# Patient Record
Sex: FEMALE | Race: WHITE | HISPANIC OR LATINO | Employment: UNEMPLOYED | URBAN - METROPOLITAN AREA
[De-identification: names, ages, dates, MRNs, and addresses within clinical notes are randomized per-mention and may not be internally consistent; named-entity substitution may affect disease eponyms.]

---

## 2021-01-13 ENCOUNTER — OFFICE VISIT (OUTPATIENT)
Dept: OBGYN CLINIC | Facility: CLINIC | Age: 31
End: 2021-01-13

## 2021-01-13 VITALS
WEIGHT: 230 LBS | SYSTOLIC BLOOD PRESSURE: 132 MMHG | HEIGHT: 69 IN | DIASTOLIC BLOOD PRESSURE: 64 MMHG | BODY MASS INDEX: 34.07 KG/M2 | HEART RATE: 81 BPM

## 2021-01-13 DIAGNOSIS — N91.2 AMENORRHEA: Primary | ICD-10-CM

## 2021-01-13 PROBLEM — Z3A.01 LESS THAN 8 WEEKS GESTATION OF PREGNANCY: Status: ACTIVE | Noted: 2021-01-13

## 2021-01-13 LAB — SL AMB POCT URINE HCG: POSITIVE

## 2021-01-13 PROCEDURE — 81025 URINE PREGNANCY TEST: CPT | Performed by: OBSTETRICS & GYNECOLOGY

## 2021-01-13 PROCEDURE — 99203 OFFICE O/P NEW LOW 30 MIN: CPT | Performed by: OBSTETRICS & GYNECOLOGY

## 2021-01-13 NOTE — PROGRESS NOTES
Note on use of High Level Disinfection Process (Trophon) for transvaginal probe:   Render Hopping   REF: [id number]   SN: [id number]     [address] [place], [place] [id number]

## 2021-01-13 NOTE — PROGRESS NOTES
Abrahan Lewis is a 31 y o  [id number] who presents for viability scan  Her LMP was 20, EGA 8w2d today     This was a planned and welcomed pregnancy with a new FOB   She previously had 2 prior children, both girls, age 5 and 11yo at a different hospital     Chief complaint today: none    OBHx: she reports 2 , she does report during her last pregnancy her blood sugar was "very elevated" she does not recall being on insulin or oral medications    ROS:   VB: denies   LOF: denies   CXN: denies   FM: n/a    Vitals:    21 1349   BP: 132/64   Pulse: 81       TVUS  Ochoa IUP measuring 91mm with EGA 7w0d and JOLENE 21  Yolk sac present  Gestational sac present  FM witnessed  FHT 145bpm    Medications  Continue PNV  Reviewed nausea and vomiting precautions in early pregnancy    RTC  2-3 week for PN intake with nurse and H&P with doctor  Reviewed early 1 hour glucola in addition to standard prenatal labs  Reviewed standard early pregnancy changes, recommendations, nutrition, folic acid    All questions answered to patient's Morenita Davidw to call with any questions or concerns      COVID 19 precautions were discussed with patient at length, reviewed symptoms, hygiene, social distancing, patient to call office  with questions/concerns      Future Appointments   Date Time Provider Jesse Warren   2021  9:30 AM [facility] [address],   PGY-4 OB/GYN   2021 2:50 PM

## 2021-01-26 ENCOUNTER — TELEPHONE (OUTPATIENT)
Dept: OBGYN CLINIC | Facility: CLINIC | Age: 31
End: 2021-01-26

## 2021-01-27 ENCOUNTER — INITIAL PRENATAL (OUTPATIENT)
Dept: OBGYN CLINIC | Facility: CLINIC | Age: 31
End: 2021-01-27

## 2021-01-27 ENCOUNTER — LAB (OUTPATIENT)
Dept: LAB | Facility: HOSPITAL | Age: 31
End: 2021-01-27

## 2021-01-27 VITALS
HEART RATE: 78 BPM | WEIGHT: 231 LBS | BODY MASS INDEX: 34.21 KG/M2 | SYSTOLIC BLOOD PRESSURE: 115 MMHG | DIASTOLIC BLOOD PRESSURE: 77 MMHG | HEIGHT: 69 IN

## 2021-01-27 DIAGNOSIS — Z3A.09 9 WEEKS GESTATION OF PREGNANCY: Primary | ICD-10-CM

## 2021-01-27 DIAGNOSIS — Z34.91 PREGNANT AND NOT YET DELIVERED IN FIRST TRIMESTER: ICD-10-CM

## 2021-01-27 DIAGNOSIS — Z3A.09 9 WEEKS GESTATION OF PREGNANCY: ICD-10-CM

## 2021-01-27 DIAGNOSIS — Z86.2 H/O IRON DEFICIENCY ANEMIA: ICD-10-CM

## 2021-01-27 LAB
ABO GROUP BLD: NORMAL
BACTERIA UR QL AUTO: NORMAL /HPF
BASOPHILS # BLD AUTO: 0.04 THOUSANDS/ΜL (ref 0–0.1)
BASOPHILS NFR BLD AUTO: 0 % (ref 0–1)
BILIRUB UR QL STRIP: NEGATIVE
BLD GP AB SCN SERPL QL: NEGATIVE
CLARITY UR: CLEAR
COLOR UR: YELLOW
EOSINOPHIL # BLD AUTO: 0.07 THOUSAND/ΜL (ref 0–0.61)
EOSINOPHIL NFR BLD AUTO: 1 % (ref 0–6)
ERYTHROCYTE [DISTWIDTH] IN BLOOD BY AUTOMATED COUNT: 12.5 % (ref 11.6–15.1)
GLUCOSE UR STRIP-MCNC: NEGATIVE MG/DL
HBV SURFACE AG SER QL: NORMAL
HCT VFR BLD AUTO: 34.4 % (ref 34.8–46.1)
HGB BLD-MCNC: 11.3 G/DL (ref 11.5–15.4)
HGB UR QL STRIP.AUTO: ABNORMAL
HYALINE CASTS #/AREA URNS LPF: NORMAL /LPF
IMM GRANULOCYTES # BLD AUTO: 0.14 THOUSAND/UL (ref 0–0.2)
IMM GRANULOCYTES NFR BLD AUTO: 1 % (ref 0–2)
KETONES UR STRIP-MCNC: NEGATIVE MG/DL
LEUKOCYTE ESTERASE UR QL STRIP: NEGATIVE
LYMPHOCYTES # BLD AUTO: 2.33 THOUSANDS/ΜL (ref 0.6–4.47)
LYMPHOCYTES NFR BLD AUTO: 23 % (ref 14–44)
MCH RBC QN AUTO: 28 PG (ref 26.8–34.3)
MCHC RBC AUTO-ENTMCNC: 32.8 G/DL (ref 31.4–37.4)
MCV RBC AUTO: 85 FL (ref 82–98)
MONOCYTES # BLD AUTO: 0.64 THOUSAND/ΜL (ref 0.17–1.22)
MONOCYTES NFR BLD AUTO: 6 % (ref 4–12)
NEUTROPHILS # BLD AUTO: 7.03 THOUSANDS/ΜL (ref 1.85–7.62)
NEUTS SEG NFR BLD AUTO: 69 % (ref 43–75)
NITRITE UR QL STRIP: NEGATIVE
NON-SQ EPI CELLS URNS QL MICRO: NORMAL /HPF
NRBC BLD AUTO-RTO: 0 /100 WBCS
PH UR STRIP.AUTO: 5.5 [PH]
PLATELET # BLD AUTO: 193 THOUSANDS/UL (ref 149–390)
PMV BLD AUTO: 10.4 FL (ref 8.9–12.7)
PROT UR STRIP-MCNC: NEGATIVE MG/DL
RBC # BLD AUTO: 4.04 MILLION/UL (ref 3.81–5.12)
RBC #/AREA URNS AUTO: NORMAL /HPF
RH BLD: POSITIVE
RPR SER QL: NORMAL
RUBV IGG SERPL IA-ACNC: 66.5 IU/ML
SP GR UR STRIP.AUTO: 1.02 (ref 1–1.03)
SPECIMEN EXPIRATION DATE: NORMAL
UROBILINOGEN UR QL STRIP.AUTO: 0.2 E.U./DL
WBC # BLD AUTO: 10.25 THOUSAND/UL (ref 4.31–10.16)
WBC #/AREA URNS AUTO: NORMAL /HPF

## 2021-01-27 PROCEDURE — 99211 OFF/OP EST MAY X REQ PHY/QHP: CPT

## 2021-01-27 PROCEDURE — 87086 URINE CULTURE/COLONY COUNT: CPT

## 2021-01-27 PROCEDURE — 81001 URINALYSIS AUTO W/SCOPE: CPT

## 2021-01-27 PROCEDURE — 80081 OBSTETRIC PANEL INC HIV TSTG: CPT

## 2021-01-27 PROCEDURE — 36415 COLL VENOUS BLD VENIPUNCTURE: CPT

## 2021-01-27 PROCEDURE — 87147 CULTURE TYPE IMMUNOLOGIC: CPT

## 2021-01-27 NOTE — LETTER
Dentist Letter    [facility]  1990  [address] [place] [address]          01/27/21    We have had several requests from local dentist requesting permission to perform procedures on our patients who are pregnant  We wish to respond with this letter regarding some of the more routine procedures that we have been asked about  The following procedures may be performed on our obstetric patients:   1  Administration of local anesthesia   2  Administration of antibiotics such as PCN, Ampicillin, and Erythromycin  3  Administration of pain medications such as Tylenol, Tylenol with codeine, and if needed Percocet  4  Shielded X-rays    Should you have any questions, please do not hesitate to contact at [phone]          Sincerely,    [facility]  [phone]

## 2021-01-27 NOTE — LETTER
Proof of Pregnancy Letter    Andreia Pineda  1990  [address] [place] [id number]        01/27/21      Andreia Sung is a patient at our facility  Andreia Sung Estimated Date of Delivery: 09/01/2021       Any questions or concerns, please feel free to contact our office      Sincerely,     [facility]   [address]   [facility], [address] [place] [address]   [phone]

## 2021-01-27 NOTE — PATIENT INSTRUCTIONS
Coronavirus (NUUWQ-55) pregnancy FAQs: Medical experts answer your questions  From [employer] cy  [email or web address]  bc (courtesy of [facility])  As of 3/18/20  By Darrick Everett 38  Medically reviewed by Society for Maternal-Fetal Medicine       We asked parents-to-be to send us their most pressing questions about coronavirus (COVID-19)  Among them: Is it still safe to deliver in a hospital? What if my ob-gyn has the virus? We sent those questions to the top docs at the Society for Maternal-Fetal Medicine  Here are their answers  The coronavirus (COVID-19) pandemic has been declared a national emergency in the [facility] by [employer]  Moms-to-be like you are concerned about everything from getting medicines to managing disruptions at work  But above and beyond any worry about lifestyle changes is a focus on your health and the impact of COVID-19 on your pregnancy  We asked obstetrics doctors who handle the most complicated pregnancy issues to answer your questions about the coronavirus  Here are their responses, provided by Dr Afsaneh Munguia and her colleagues at the Society for [place] [employer]  Am I at more risk for COVID-19 if I'm pregnant? We don't know  Pregnancy does change your immune system in ways that might make you more susceptible to viral respiratory infections like COVID-19  If you become infected, you might also be at higher risk for more severe illness compared to the general population  Although this does not appear to be the case with COVID-19, based on limited data so far, a higher risk has been true for pregnant women with other coronavirus infections (SARS-CoV and MERS-CoV) and other viral respiratory infections, such as flu  It's important to take preventive actions to avoid infection, such as washing your hands often and avoiding people who are sick      How might coronavirus affect my pregnancy? Again, we don't know  Women with other coronavirus infections (such as SARS-CoV) did not have miscarriage or stillbirth at higher rates than the general population  We know that having other respiratory viral infections during pregnancy, such as flu, has been associated with problems like low birth weight and  birth  Also, having a high fever early in pregnancy may increase the risk of certain birth defects  Could I transmit coronavirus to my baby during pregnancy or delivery? We don't know whether you could transmit COVID-19 to your baby before or during delivery  However, among the few case studies of infants born to mothers with COVID-23 published in peer-reviewed literature, none of the infants tested positive for the virus  Also, there was no virus detected in samples of amniotic fluid or breast milk  There have been a few reports of newborns as young as a few days old with infection, suggesting that a mother can transmit the infection to her infant through close contact after delivery  There have been no reports of mother-to-baby transmission for other coronaviruses (MERS-CoV and SARS-CoV), although only limited information is available  Is it safe for me to deliver at a hospital where there have been COVID-19 cases? Yes  We know that COVID-19 is a very scary virus  The good news is that hospitals are taking great precautions to keep patients and healthcare providers safe  When a patient is even suspected to have COVID-19, they are placed in a negative pressure room  (Think of these rooms as vacuums that suck and filter the air so it's safe for the other people in the hospital)  This makes it possible for you to deliver at the hospital without putting you or your baby at risk  Hospitals are also implementing stricter visiting policies to keep patients safe  It's worth calling your hospital to check if there are any new regulations to be aware of      What plans should I make now in case the hospital system is overwhelmed when it's time for me to deliver? This is a great question to talk with your doctor or midwife about when you're 34 to 36 weeks pregnant  Every hospital is making different plans for dealing with this scenario  I work in healthcare  Should I ask my doctor to excuse me from work until the baby is born? What if I work in a school, the travel [employer], or some other high-risk setting? Healthcare facilities should take care to limit the exposure of pregnant employees to patients with confirmed or suspected COVID-19, just as they would with other infectious cases  If you continue working, be sure to follow the CDC's risk assessment and infection control guidelines  If you work in a school, travel [employer], or other high-risk setting, talk with your employer about what it's doing to protect employees and minimize infection risks  Wash your hands often  What if my OB gets COVID-19? If your doctor or midwife tests positive for COVID-19, they will need to be quarantined until they recover and are no longer at risk of transmitting the virus  In this case, you'll be assigned to another OB in your doctor's practice (or you may choose another practitioner yourself)  Ask your new OB or your doctor's office if you should self-quarantine or be tested for the virus  (It will depend on when you last saw your provider and when that person tested positive )    Should we hold off on trying to conceive because of COVID-19? At this time, there's no reason to hold off on trying to get pregnant, but the data we have is really limited  For example, we don't think the virus causes birth defects or increases your risk of miscarriage  But we don't know whether you could transmit COVID-19 to your baby before or during delivery  We also don't know if the virus lives in semen or can be sexually transmitted      We have a babymoon scheduled in the next few months - should we cancel? If you're planning to travel internationally or on a cruise, you should strongly consider canceling  At this time, the virus has reached more than 140 countries, and there are travel bans to [place], most of [place], and [place]  Places where large numbers of people gather are at highest risk, especially airports and cruise ships  If you're planning travel in the [place] , note that any travel setting increases your risk of exposure, and there may be travel bans even in [place] by the time you're scheduled to go  Even if you're state is not blocked, you'll definitely want to avoid traveling to communities where the virus is spreading  To find out where these places are, check The [place] Times map based on CDC data  For the most current advice to help you avoid exposure, check the CDC's COVID-19 travel page  Will the hospital separate me from my  and keep the baby in quarantine? If you test positive for COVID-19 or have been exposed but have no symptoms, the CDC, [employer] of Obstetricians and Gynecologists, and the Society for [place] 250 all recommend that you be  from your baby to decrease the risk of transmission to the baby  This would last until you are no longer at risk of transmitting the virus  If you do not have COVID-19 and have not been exposed to the virus, the hospital will not separate you from your   My hospital is restricting visitors and only allowing one support person  If my support person leaves after the delivery, will they be allowed to come back? Every hospital has different policies  Contact your hospital or labor and delivery unit a week or so before delivery to get the most up-to-date restrictions  In general, if your support person needs to leave, they would be allowed back unless they knew they were exposed to COVID-19 after leaving your company    [facility] understands that the coronavirus (COVID-19) pandemic is an evolving story and that your questions will change over time  We'll continue asking moms and dads in our Community what they want to know, and we'll get the answers from experts to keep them - and you - informed and supported  My mom was planning to fly here to help me care for my new baby after delivery  Should I tell her not to come? Yes  If your mom is over 61 or has any serious chronic medical conditions (such as heart disease, lung disease, or diabetes), she is at higher risk of serious illness from COVID-19 and should avoid air travel  And remember that any travel setting increases a person's risk of exposure  So, it may be risky to have her around the baby after she has been traveling  For the most current advice on traveling, check the [facility]'s COVID-19 travel page  [facility]Center understands that the coronavirus pandemic is an evolving story and that your questions will change over time  We'll continue asking moms and dads in our Community what they want to know, and we'll get the answers from experts to keep them - and you - informed and supported  [facility] would like to say Congratulations on your pregnancy! We are happy that you have chosen us to be your health care provider during your pregnancy  Your health, the health of your unborn child (children) and your family is important to us  Now, more than ever, your health will be most important to you  Your baby's growth and progress can be affected by how well you take care of yourself  It's a good idea to plan ahead  It is a known fact that women who receive care early in pregnancy and throughout their pregnancy have healthier babies  Visits will be scheduled for you throughout your pregnancy  It is your duty to keep your appointments and follow directions for your care  The number of visits will be decided by your doctor  Don't be afraid to ask questions   Talk with your nurses and providers about your plans for birth and any concerns you may have  Please call Maternal Fetal Medicine ([facility]) at [phone] to schedule your ultrasound appointment  o Sequential Screening   (optional)   - Done between 12 - 13 weeks gestation   Ultrasound    Risks for Trisomy 25 and 24   Spina Bifida (second part), after 16 weeks, will be explained by the [facility] office  o QUAD screen, if applicable   o Anatomy Scan   - 20 week ultrasound  - Gender may be revealed, your preference  - 1 hour appointment, only 1 support person allowed, NO children   Blood work : Please complete prior to your H&P appointment  o You do NOT have to fast for any blood work unless instructed  - CBC, Blood type and antibody screen, Urinalysis, Random glucose level, HIV, Syphilis, Hepatitis B   IF applicable: 1 hour Glucola or Hemoglobin A1C, 24 hour urine, CMP, Protein creatinine ratio   History &Physical: H&P appointment   o Physical exam  o Pelvic exam: GC/CT testing  o If needed: Pap smear  - Routine prenatal   o Visits every 4 weeks until 28 weeks  o At your prenatal visits we will:   - Check baby's heart beat and measure your growing belly, collect a urine sample, weight, and blood pressure   - 28 weeks:  Prenatal visits will be every 2 weeks  - TDaP vaccine, Complete blood count, RPR  o Blood type and antibodies   - Rhogham may be given at this time, if needed  o Gestational Diabetes, 1 hr Glucola test (non-fasting)  - If you fail the 1 hr Glucola, a 3 hr Glucola will be ordered by your provider  The 3 hr Glucola test is fasting     - If you fail the 3 hr Glucola test, you will be referred to the Maternal Fetal Medicine diabetic education team  They can further assist you by calling [phone]   Start performing daily fetal kick counts   Prenatal classes  o Prepared childbirth Education classes, Breastfeeding,  Care, and Infant/Child CPR  - Call [place]  [phone] to sign up   - 36 weeks: Prenatal visits start to become weekly     o Group Beta Strep (GBS) will be collected  - This is done by a vaginal swab in the office  o Chlamydia/Gonorrhea testing will be obtained as well, if applicable   Prepare for delivery  - Back your bag and belongings for the hospital  - Familiarize yourself with visiting guidelines   RELAX:  o Enjoy the last few weeks of your pregnancy  Warning signs during pregnancy  [phone]  Answering service during non-business hours     1  Vaginal bleeding  2  Sharp abdominal pain that does not go away  3  Fever (more than 100 4 and is not relieved by Tylenol)  4  Persistent vomiting lasting greater than 24 hours  5  Chest pain   6  Pain or burning when you urinate  7  Severe headache that doesn't resolve with Tylenol  8  Blurred vision or seeing spots in your vision  9  Sudden swelling of your face or hands  10  Redness, swelling or pain in a leg  11  A sudden weight gain in just a few days  12  Decrease in your baby's movement (after 28 weeks or the 6th month of pregnancy)  13  A loss of watery fluid from your vagina - can be a gush, a trickle or continuous wetness  14  After 20 weeks of pregnancy, rhythmic cramping (greater than 4 per hour) or menstrual like low/pelvic pain  15  You have cravings for substances such as eugene, dirt, laundry starch, or ice  Medications and Pregnancy  The following list of over-the-counter medications is usually considered safe to take during pregnancy  Take care to not double up on products containing acetaminophen (Tylenol)   Colds/Sore Throat  o Robitussin DM - Plain (guaifenesin)  o Saline nasal spray  o Warm salt water gargle  o Cepacol throat lozenges or mouthwash (cetylpyridinium)  o Sucrets (hexylresoricinol)   Allergy  o AVOID the D - or DECONGESTANT  - Claritin (loratadine)  - Zrytec (cetirizine)  - Allerga (fexofenadine)   Headaches / Aches and Pains  o Tylenol (acetaminophen)  - Do NOT exceed more than 3000 mg of Tylenol in a 24-hour period     Heartburn  o Mylanta (aluminum hydroxide/simethicone, magnesium hydroxide)  o Maalaox (aluminum magnesium hydroxide, magnesium hydroxide)  o Tums (calcium carbonate)  o Riopan (magaldrate)   Constipation  o Colace (docusate sodium)  o Surfak (docusate sodium)  o MiraLAX  o Glycerin suppositories  o Fleets enema (sodium phosphate & sodium biphosphate)   Nausea/Vomiting  o Vitamin B6 (pyridoxine) - May take 50 mg at bedtime, 25 mg in the morning, 25 mg in the afternoon  o Unisom (doxylamine) - May use for nausea/vomiting - (cut a 25 mg tablet in half)  May cause drowsiness   Sleep  o Benadryl (diphenhydramine) - Take 1-2 tablets as needed at bedtime  o Unisom (doxylamine) 25 mg tablet - As needed at bedtime  o Melatonin 5 mg tablet - As needed at bedtime    Generally the generic form of medicine is usually lower priced than the brand name form of the medicine  Talk to your healthcare provider before you take any medicines  Many medicines may harm your baby if you take them when you are pregnant  Do not take any medicines, vitamins, herbs, or supplements without first talking to your healthcare provider  Pregnancy   What you need to know about pregnancy:    o A normal pregnancy lasts about 40 weeks  o The first trimester lasts from your last period through the 12th week of pregnancy  o The second trimester lasts from the 13th week of your pregnancy through the 23rd week  o The third trimester lasts from your 24th week of pregnancy until your baby is born  Body changes that may occur during your pregnancy:  1  Breast changes you will experience include tenderness and tingling during the early part of your pregnancy  Your breasts will become larger  You may need to use a support bra  You may see a thin, yellow fluid, called colostrum, leak from your nipples during the second trimester  Colostrum is a liquid that changes to milk about 3 days after you give birth    2  Skin changes and stretch marks  may occur during your pregnancy  You may have red marks, called stretch marks, on your skin  Stretch marks will usually fade after pregnancy  Use lotion if your skin is dry and itchy  The skin on your face, around your nipples, and below your belly button may darken  Most of the time, your skin will return to its normal color after your baby is born  3  Morning sickness  is nausea and vomiting that can happen at any time of day  Avoid fatty and spicy foods  Eat small meals throughout the day instead of large meals  Donna may help to decrease nausea  Ask your healthcare provider about other ways of decreasing nausea and vomiting  4  Heartburn  may be caused by changes in your hormones during pregnancy  Your growing uterus may also push your stomach upward and force stomach acid to back up into your esophagus  Eat 4 or 5 small meals each day instead of large meals  Avoid spicy foods  Avoid eating right before bedtime  5  Constipation  may develop during your pregnancy  To treat constipation, eat foods high in fiber such as fiber cereals, beans, fruits, vegetables, whole-grain breads, and prune juice  Get regular exercise and drink plenty of water  Your healthcare provider may also suggest a fiber supplement to soften your bowel movements  Talk to your healthcare provider before you use any medicines to decrease constipation  6  Hemorrhoids  are enlarged veins in the rectal area  They may cause pain, itching, and bright red bleeding from your rectum  To decrease your risk of hemorrhoids, prevent constipation and do not strain to have a bowel movement  If you have hemorrhoids, soak in a tub of warm water to ease discomfort  Ask your healthcare provider how you can treat hemorrhoids  7  Leg cramps and swelling  may be caused by low calcium levels or the added weight of pregnancy  Raise your legs above the level of your heart to decrease swelling  During a leg cramp, stretch or massage the muscle that has the cramp   Heat may help decrease pain and muscle spasms  Apply heat on your muscle for 20 to 30 minutes every 2 hours for as many days as directed  8  Back pain  may occur as your baby grows  Do not stand for long periods of time or lift heavy items  Use good posture while you stand, squat, or bend  Wear low-heeled shoes with good support  Rest may also help to relieve back pain  Ask your healthcare provider about exercises you can do to strengthen your back muscles  Stay healthy during your pregnancy     Eat a variety of healthy foods  Healthy foods include fruits, vegetables, whole-grain breads, low-fat dairy foods, beans, lean meats, and fish  Drink liquids as directed  Ask how much liquid to drink each day and which liquids are best for you   Caffeine: It is not clear how caffeine affects pregnancy  Limit your intake of caffeine to avoid possible health problems  o Limit caffeine to less than 200 milligrams each day  - Caffeine may be found in coffee, tea, cola, sports drinks, and chocolate   Foods that contain mercury:  Mercury is naturally found in almost all types of fish and shellfish  Some types of fish absorb higher levels of mercury that can be harmful to an unborn baby  Eat only fish and shellfish that are low in mercury  o Limit your intake of fish to 2 servings each week  - Choose fish low in mercury such as canned light tuna, shrimp, crab, salmon, cod, or tilapia   Do not  eat fish high in mercury such as swordfish, tilefish, maryan mackerel, and shark   - Each week, you may eat up to 12 ounces of fish or shellfish that have low levels of mercury  These include shrimp, canned light tuna, salmon, pollock, and catfish    - Eat only 6 ounces of albacore (white) tuna per week  Albacore tuna has more mercury than canned tuna   Take prenatal vitamins as directed  Your need for certain vitamins and minerals, such as folic acid, increases during pregnancy   Prenatal vitamins provide some of the extra vitamins and minerals you need  Prenatal vitamins may also help to decrease the risk of certain birth defects   Weight: Ask how much weight you should gain during your pregnancy  Too much or too little weight gain can be unhealthy for you and your baby   Exercise: Talk to your healthcare provider about exercise  Moderate exercise can help you stay fit  Your healthcare provider will help you plan an exercise program that is safe for you during pregnancy  o Drink plenty of fluids while exercising to stay hydrated  Be careful to avoid overheating  o AVOID exercises that can make you lose your balance    o Activities that can put your baby at risk i e  horseback riding, scuba diving, skiing or snowboarding  o After your first trimester, avoid exercises that require you to lay flat on your back  o AVOID exceeding a heart rate greater than 140 beats per minute  As long as you are able to hold a conversation while exercising your heart rate is likely acceptable   ALWAYS wear your seat belt   o The shoulder belt should lay across your chest (between your breasts) and away from your neck    o Secure the lap belt below your belly so that it fits snugly across your hips and pelvic bone   Perform Kegel exercise  o Imagine yourself stopping the flow of urine, pankaj the muscles of your pelvic floor  Hold that contraction for 10 seconds and release  - They can be repeated 10-20 times per day   Do not smoke  If you smoke, it is never too late to quit  Smoking increases your risk of a miscarriage and other health problems during your pregnancy  Smoking can cause your baby to be born too early or weigh less at birth  Ask your healthcare provider for information if you need help quitting   Do not drink alcohol  Alcohol passes from your body to your baby through the placenta  It can affect your baby's brain development and cause fetal alcohol syndrome (FAS)   FAS is a group of conditions that causes mental, behavior, and growth problems  o Alcohol:  Do not drink alcohol during pregnancy  Alcohol can increase your risk of a miscarriage (losing your baby)   Never use illegal or street drugs (such as marijuana or cocaine) while you are pregnant  Safety tips:   Avoid hot tubs and saunas  Do not use a hot tub or sauna while you are pregnant, especially during your first trimester  Hot tubs and saunas may raise your baby's temperature and increase the risk of birth defects   Avoid toxoplasmosis  This is an infection caused by eating raw meat or being around infected cat feces  It can cause birth defects, miscarriages, and other problems  Wash your hands after you touch raw meat  Make sure any meat is well-cooked before you eat it  Avoid raw eggs and unpasteurized milk  Use gloves or ask someone else to clean your cat's litter box while you are pregnant   Ask your healthcare provider about travel  The most comfortable time to travel is during the second trimester  Ask your healthcare provider if you can travel after 36 weeks  You may not be able to travel in an airplane after 36 weeks  He may also recommend that you avoid long road trips  Pregnancy Diet  WHAT YOU NEED TO KNOW:   What is a healthy diet during pregnancy? A healthy diet during pregnancy is a meal plan that provides the amount of calories and nutrients you need during pregnancy  Your body needs extra calories and nutrients to support your growing baby  You need to gain the right amount of weight for a healthy baby and pregnancy  Babies born at a healthy weight have a lower risk of certain health problems at birth and later in life  A healthy diet may help you avoid gaining too much weight  Too much weight gain may cause problems for you during your pregnancy and delivery   What should I AVOID while I am pregnant?   o Raw and undercooked foods:   You should not eat undercooked or raw meat, poultry, eggs, fish, or shellfish (shrimp, crab, lobster)  Cook leftover foods and ready-to-eat foods such as hot dogs until they are steaming hot    o Unpasteurized food:  Unpasteurized foods are foods that have not gone through the heating process (pasteurization) that destroys bacteria  You should not drink milk, juice, or cheese that has not been pasteurized  This includes Brie, feta, Camembert, blue, and [place] cheeses   Which foods can I eat while I am pregnant? Eat a variety of foods from each of the food groups listed below  Your dietitian will tell you how many servings you should have from each food group each day to get enough calories  The amount of calories you need depends on your daily activity, your weight before pregnancy, and current weight gain  Healthcare providers divide pregnancy into 3 periods of time called trimesters  In the first trimester, you usually do not need extra calories  In the second and third trimesters, most women should eat about 300 extra calories each day   What vitamin and mineral supplements may I need? Your healthcare provider will tell you if you need a supplement and the type you should take  Talk to your healthcare provider before you take any other kind of supplement, including herbal (natural) supplements  o Prenatal vitamins:  Eat a variety of healthy foods, even if you take a prenatal vitamin  If you forget to take your vitamin, do not take double the amount the next day    o Folic acid:  You need at least 380 mcg of folic acid each day before you get pregnant  Folic acid helps to form your baby's brain and spinal cord in early pregnancy  During pregnancy, your daily need for folic acid increases to about 600 mcg  Get folic acid each day by eating citrus fruits and juices, green leafy vegetables, liver, or dried beans   Folic acid is also added to foods such as breakfast cereals, bread products, flour, and pasta    o Iron:  Iron is a mineral the body needs to make hemoglobin, which is a part of red blood cells  Hemoglobin helps your blood carry oxygen from the lungs to the rest of your body  Foods that are good sources of iron are meat, poultry, fish, beans, spinach, and fortified cereals and breads  Your body will absorb iron better from non-meat sources if you have a source of vitamin C at the same time  Drink tea and coffee separately from iron-fortified foods and iron supplements  You need about 30 mg of iron each day during pregnancy  o Calcium and vitamin D:  Women who do not eat dairy products may need a calcium and vitamin D supplement  Talk to your healthcare provider about calcium supplements if you do not regularly eat good sources of calcium  The amount of calcium you need is about 1,300 mg if you are between 15and 25years old and 1,000 mg if you are 23to 48years old   What other healthy guidelines should I follow? o Vegetarians and vegans: If you are a vegetarian or vegan, get enough protein, vitamin B12, and iron during your pregnancy  Some non-meat sources of these nutrients are fortified cereals, nut butters, soy products (tofu and soymilk), nuts, grains, and legumes  These nutrients are also found in eggs and milk products  o Cravings: You may have cravings for certain foods during your pregnancy  Foods that are high in calories, fat, and sugar should not replace healthy food choices  Some women have cravings for unusual substances such as eugene, dirt, laundry starch, ice, and chalk  This condition is called pica  These may lead to health problems such as anemia and cause other health problems  Nausea and Vomiting in Pregnancy  Nausea and vomiting in pregnancy  can happen any time of day  These symptoms usually start before the 9th week of pregnancy, and end by the 14th week (second trimester)  Some women can have nausea and vomiting for a longer time  These symptoms can affect some women throughout the entire pregnancy  Nausea and vomiting do not harm your baby   These symptoms can make it hard for you to do your daily activities   Seek care immediately if:   o You have signs of dehydration  Examples are dark yellow urine, dry mouth and lips, dry  skin, fast heartbeat, and urinating less than usual   o You have severe abdominal pain   o You feel too weak or dizzy to stand up   o You see blood in your vomit or bowel movements  o Contact your healthcare provider if:   o You vomit more than 4 times in 1 day   o You have not been able to keep liquids down for more than 1 day   o You lose more than 2 pounds   o You have a fever   o Your nausea and vomiting continue longer than 14 weeks    o You have questions or concerns about your condition or care  Treatment  for nausea and vomiting in pregnancy is usually not needed  Talk to your healthcare provider if your symptoms do not decrease with the changes suggested below  You may need vitamin B6 and medicine if these changes do not help, or your symptoms become severe  Nutrition changes you can make to manage nausea and vomiting:    Eat small meals throughout the day instead of 3 large meals  You may be more likely to have nausea and vomiting when your stomach is empty  Eat foods that are low in fat and high in protein  Examples are lean meat, beans, turkey, and chicken without the skin  Eat a small snack, such as crackers, dry cereal, or a small sandwich before you go to bed   Eat some crackers or dry toast before you get out of bed in the morning  Get out of bed slowly  Sudden movements could cause you to get dizzy and nauseated   Eat bland foods when you feel nauseated  Examples of bland foods include dry toast, dry cereal, plain pasta, white rice, and bread  Other bland foods include saltine crackers, bananas, gelatin, and pretzels  Avoid spicy, greasy, and fried foods  Avoid any other foods that make you feel nauseated   Drink liquids that contain ginger    Drink ginger ale made with real ginger or ginger tea made with fresh grated donna  Donna capsules or donna candies may also help to decrease nausea and vomiting   Drink liquids between meals instead of with meals  Wait at least 30 minutes after you eat to drink liquids  Drink small amounts of liquids often throughout the day to prevent dehydration  Ask how much liquid you should drink each day   Other changes you can make to manage nausea and vomiting:    Avoid smells that bother you  Strong odors may cause nausea and vomiting to start, or make it worse  Take a short walk, turn on a fan, or try to sleep with the window open to get fresh air  When you are cooking, open windows to get rid of smells that may cause nausea   Do not brush your teeth right after you eat  if it makes you nauseated   Rest when you need to  Start activity slowly and work up to your usual routine as you start to feel better   Talk to your healthcare provider about your prenatal vitamins  Prenatal vitamins can cause nausea for some women  Try taking your prenatal vitamin at night or with a snack  If this change does not help, your healthcare provider may recommend a different type of vitamin   Do not use any medicines, vitamins, or supplements to manage your symptoms without asking your healthcare provider  Many medicines can harm an unborn baby   Light to moderate exercise  may help to decrease your symptoms  It may also help you to sleep better at night  Ask your healthcare provider about the best exercise plan for you      Breastfeeding    Benefits of breastfeeding  o Are less likely to develop allergies  o Have increased protection against bacterial meningitis  o Have fewer middle ear infections  o Have fewer learning disabilities  o Have fewer behavioral difficulties  o Have higher IQ's  o Have lower rates of respiratory illness  o Have lower obesity  o Are less likely to develop juvenile diabetes and some childhood cancers  o Are less likely to die from Sudden Infant Death Syndrome  o A healthier baby means fewer visits to the doctor and the pharmacy  o Breastfeeding is environmentally friendly  There is nothing to throw away    o Breastfeeding is free; formula can cost over $1000 for the first year of life  o There is less vaginal bleeding immediately after delivery and a faster return to your pre-pregnant size  - Mothers who breastfeed a lifetime total of 2 years have:   A 40% decreased risk of breast cancer    A decreased risk of ovarian cancer   Lower rates of osteoporosis, diabetes and heart disease   Importance of exclusive breastfeeding  o By providing the ideal food for the healthy growth and development of infants, exclusive  infants receive only breast milk    o Exclusive breastfeeding for the first 6 months is very important to improve an infant's health and reduce childhood illness   Exclusive breastfeeding for the first 6 months  o The American Academy of Pediatrics recommends exclusive breastfeeding for the first six months and continued breastfeeding with supplementation of solids for the next 6 months   Early initiation of breastfeeding  o Women who feed their infants within the first hour of birth is referred to as Christin Snellen initiation of breastfeeding and ensures that the  receives colostrum  o Colostrum is rich in antibodies and essential nutrients   Rooming-In  o May stabilize 's breathing and heart rate  o Reduce crying  o Improve ability for  to maintain temperature and blood sugar levels  o Early stimulation of baby's immune system  o Calming effects  o Easier and faster bonding   o Rooming-in promotes getting to know your    o Rooming-in makes breastfeeding easier  o Women who room-in with their newborns make more milk and produce a good milk supply earlier  o Becomes easier to recognize baby's feeding cues  o Infants have longer breastfeeding sessions    o Women who room-in with their newborns are more likely to exclusively breastfeed compared to women who are  from their newborns   Skin-to-Skin  o Skin to skin contact should happen regardless of a woman's feeding preference or the mode of delivery  o After the delivery of your baby, it's important that a healthy mother and her baby have uninterrupted skin-to-skin contact   o Skin to skin contact should occur immediately after birth for a least one-two hours and until after the first feeding for breastfeeding mothers  o Skin-to-skin contact means placing dried, unclothes newborns on their mother's bare chest, with warm blankets covering the baby's back  o All routine procedures such as maternal and  assessments can take place during skin-to-skin contact or can be delayed until after the sensitive period immediately after birth  We are proud to offer extensive educational and support services to encourage mothers to breastfeed  This service offers information, education, and support for women who want to breast feed  Mothers can leave a message or breastfeeding question on the line anytime of the day  Nurses will respond within 72 hours  The Breast Feeding Answer Line is [phone] ([phone])  Please DO NOT leave urgent or emergent messages on this message line  Please DO contact your physician DIRECTLY if you have any urgent questions or concerns   In the event of a suspected emergency medical condition please CALL 911 or [address]      Attaching your baby at the Breast (English): [email or web address]-[email or web address]-[email or web address]-breast/?[email or web address]    Attaching your baby at the Breast (Fijian):  [email or web address]-[email or web address]      [facility] : [place],  [address] : [place], [place] - Should be more than 32 weeks pregnant to deliver at [facility] [place]

## 2021-01-27 NOTE — LETTER
Work Letter    Vitaly Moya  1990  [id number]    Dear Vitaly Moya,      01/27/21        Your employee is a patient at [facility]  We recommend that all pregnant women:    1  Have a well-ventilated workspace  2  Wear low-heeled shoes  3  Work no more than 40 hours per week  4  Have a 15 minute break every 2 hours and at least 30 minutes for a meal break  5  Use good body mechanics by bending at your knees to avoid back strain and lift no more than 20 pounds without assistance  Will need assistance with lifting over 20 lbs  6  Have ready access to bathrooms and water  She may continue to work until her due date unless medical complications arise  We anticipate she may return to work in 6-8 weeks after delivery       Sincerely,    [facility]

## 2021-01-27 NOTE — LETTER
[facility] Letter    Betty Kashif  1990  [id number]       01/27/21          Betty Rowland is a patient and under our care in our office  Aundra Cristobal Christians's Estimated Date of Delivery: None noted     Any questions or concerns feel free to contact our office       Thank you,    [address] [facility],[address]  [id number] [facility]/[place]  [place] [address]  [facility] [place]  [address]  [phone]

## 2021-01-27 NOTE — PROGRESS NOTES
OB INTAKE INTERVIEW  Pt presents for OB intake  [id number]  OB History    Para Term  AB Living   3 2 2     2   SAB TAB Ectopic Multiple Live Births           2      # Outcome Date GA Lbr Ever/2nd Weight Sex Delivery Anes PTL Lv   3 Current            2 Term 11 40w0d   F Vag-Spont None N RICK   1 Term 07 39w0d   F Vag-Spont None N RICK      Obstetric Comments   Menstrual cycle: 16     Hx of  delivery prior to 36 weeks 6 days:  No  Last Menstrual Period:    Patient's last menstrual period was 2020 (exact date)  Ultrasound date: 2021  7 weeks 0 days     Estimated Date of Delivery: 2021  by US  H&P visit scheduled  February 15, 2021 @ 11:30 am  with Dr Delmy Duke  Last pap smear: No records of pap smear, will collect at  H&P    Current Issues:  Constipation :   No  Headaches :   Yes  Cramping:  No  Spotting :   No  PICA cravings :  No  FOB Involved:   Yes  Planned pregnancy:  Yes  I have these concerns about this prenatal patient:   1  9 weeks gestation of pregnancy  9 0 weeks as of 21   - Prenatal Panel; Future   - QUAD Screen; Future    Dates:  -     2  Pregnant and not yet delivered in first trimester  - Ambulatory Referral to Maternal Fetal Medicine; Future   Sequential Screen: 2021 @ 2:30 pm   Level 2:  2021 @ 11 am   - Ambulatory referral to social work care management program; Future   Eunice score: 12    3  H/O iron deficiency anemia      Interview education   [facility] Pregnancy Essentials reviewed and discussed    Baby and Me [address] Handout   [facility] Hand[facility]   Discussed genetic testing   Prenatal lab work: Scripts printed and given to pt   Influenza vaccine given today: No   Discussed Tdap vaccine  Immunizations: There is no immunization history on file for this patient    Depression Screening Follow-up Plan: Patient's depression screening was negative with an [place] score of 12  Patient assessed for underlying major depression  They have no active suicidal ideations  Brief counseling provided and recommend additional follow-up/re-evaluation next office visit     Nurse/Family Partnership- referral placed:  N/A  BMI Counseling: Body mass index is 34 11 kg/m²  Tobacco Cessation Counseling: non-smoker  Infection Screening: Does the pt have a hx of MRSA? No  The patient was oriented to our practice and all questions were answered    Interviewed by: Jennifer Hart RN 01/27/21

## 2021-01-28 ENCOUNTER — PATIENT OUTREACH (OUTPATIENT)
Dept: OBGYN CLINIC | Facility: CLINIC | Age: 31
End: 2021-01-28

## 2021-01-28 LAB
BACTERIA UR CULT: ABNORMAL
HIV 1+2 AB+HIV1 P24 AG SERPL QL IA: NORMAL

## 2021-01-28 NOTE — PROGRESS NOTES
[facility] had reached out to the patient  [facility] had completed a prenatal assessment via the phone with the patient  JASMINE notes patient is a 31year old woman who resides with the [facility] and her two other children, ages 15 and 5  [facility] note this is the patient's third pregnancy  Patient and FOB are both happy for the pregnancy  Patient expresses additional support from family and friends  Patient stated she did not have any hx of depression, smoking, drug and alcohol  Patient also states she has not have had involvement with CYS in the past nor present  Patient mentions there is no legal hx  Patient does not have a hx of verbal and/or physical abuse  Patient states she has not been in the foster care system  Patient stated she did not finish High School  Patient is unemployed  FOB is employed  SW notes that an area of need expressed by the patient is formula and diapers for the baby as well as financial concerns  [facility] had offered to send the patient a list of resources via e-mail of local places that offer help with formula and diapers  [facility] notes the patient would like the list emailed to her  Patient does not have MA/Cash Assistance/SNAP/WIC  [facility] offered assistance to community referral  Patient denied assistance stating she knows how to obtain assistance on her own  [facility] provided the patient with the contact number to the main [place] [phone] located at [facility] office for [place] in [place], [place] for any future needs  [facility] will continue to be available if needed

## 2021-02-15 ENCOUNTER — ROUTINE PRENATAL (OUTPATIENT)
Dept: OBGYN CLINIC | Facility: CLINIC | Age: 31
End: 2021-02-15

## 2021-02-15 ENCOUNTER — TELEPHONE (OUTPATIENT)
Dept: OBGYN CLINIC | Facility: CLINIC | Age: 31
End: 2021-02-15

## 2021-02-15 VITALS
SYSTOLIC BLOOD PRESSURE: 135 MMHG | HEIGHT: 69 IN | HEART RATE: 89 BPM | BODY MASS INDEX: 34.07 KG/M2 | WEIGHT: 230 LBS | DIASTOLIC BLOOD PRESSURE: 64 MMHG

## 2021-02-15 DIAGNOSIS — Z12.4 CERVICAL CANCER SCREENING: Primary | ICD-10-CM

## 2021-02-15 DIAGNOSIS — Z3A.11 11 WEEKS GESTATION OF PREGNANCY: ICD-10-CM

## 2021-02-15 LAB
SL AMB  POCT GLUCOSE, UA: NEGATIVE
SL AMB LEUKOCYTE ESTERASE,UA: NEGATIVE
SL AMB POCT BILIRUBIN,UA: NEGATIVE
SL AMB POCT BLOOD,UA: NEGATIVE
SL AMB POCT KETONES,UA: NEGATIVE
SL AMB POCT NITRITE,UA: NEGATIVE
SL AMB POCT PH,UA: 6
SL AMB POCT SPECIFIC GRAVITY,UA: 1.02
SL AMB POCT URINE PROTEIN: NORMAL
SL AMB POCT UROBILINOGEN: NEGATIVE

## 2021-02-15 PROCEDURE — 81002 URINALYSIS NONAUTO W/O SCOPE: CPT | Performed by: OBSTETRICS & GYNECOLOGY

## 2021-02-15 PROCEDURE — 87591 N.GONORRHOEAE DNA AMP PROB: CPT | Performed by: OBSTETRICS & GYNECOLOGY

## 2021-02-15 PROCEDURE — 87624 HPV HI-RISK TYP POOLED RSLT: CPT | Performed by: OBSTETRICS & GYNECOLOGY

## 2021-02-15 PROCEDURE — 99213 OFFICE O/P EST LOW 20 MIN: CPT | Performed by: OBSTETRICS & GYNECOLOGY

## 2021-02-15 PROCEDURE — 87491 CHLMYD TRACH DNA AMP PROBE: CPT | Performed by: OBSTETRICS & GYNECOLOGY

## 2021-02-15 PROCEDURE — G0145 SCR C/V CYTO,THINLAYER,RESCR: HCPCS | Performed by: OBSTETRICS & GYNECOLOGY

## 2021-02-15 NOTE — PROGRESS NOTES
OB/GYN  PRENATAL H&P VISIT  Ned Howell  2/15/2021  11:28 AM  Dr Rafael Carl MD      SUBJECTIVE  Patient is a [id number] at [address]   , JOLENE 2021   here for initial prenatal H&P  This is an intended and desired pregnancy  Patient is currently doing good, she states some intermittent nausea but not vomit  She is here with FOB  Her previous pregnancies were uncomplciated  She had 2 previous   She currently no work  She has good a support system at home  She denies hx of STD/STI, denies a hx of TB or close contacts with persons with TB  She denies a family history of inheritable conditions such as physical or intellectual disabilities, birth defects, blood disorders, heart or neural tube defects  She denies had MRSA  She denies recent travel or travel planned in the near future  She Denies use of nicotine or recreational drug use  She denies vaginal bleeding, cramping, leakage, abnormal discharge  OB History    Para Term  AB Living   3 2 2 0 0 2   SAB TAB Ectopic Multiple Live Births   0 0 0 0 2      # Outcome Date GA Lbr Ever/2nd Weight Sex Delivery Anes PTL Lv   3 Current            2 Term 11 40w0d   F Vag-Spont None N RICK   1 Term 07 39w0d   F Vag-Spont None N RICK      Obstetric Comments   Menstrual cycle: 16       Review of Systems   Constitutional: Negative  HENT: Negative  Eyes: Negative  Respiratory: Negative  Cardiovascular: Negative  Gastrointestinal: Positive for nausea  Endocrine: Negative  Genitourinary: Negative  Musculoskeletal: Negative  Allergic/Immunologic: Negative  Neurological: Negative  Hematological: Negative  Psychiatric/Behavioral: Negative          Past Medical History:   Diagnosis Date    Anemia        Past Surgical History:   Procedure Laterality Date    NO PAST SURGERIES         Social History     Socioeconomic History    Marital status: Single     Spouse name: Not on file    Number of children: 2    Years of education: Not on file    Highest education level: Not on file   Occupational History    Not on file   Social Needs    Financial resource strain: Not very hard    Food insecurity     Worry: Sometimes true     Inability: Never true   [employer] needs     Medical: No     Non-medical: No   Tobacco Use    Smoking status: Never Smoker    Smokeless tobacco: Never Used   Substance and Sexual Activity    Alcohol use: Not Currently     Frequency: Never     Binge frequency: Never    Drug use: Never    Sexual activity: Yes     Partners: Male     Birth control/protection: None   Lifestyle    Physical activity     Days per week: 0 days     Minutes per session: 0 min    Stress: Only a little   Relationships    Social connections     Talks on phone: Once a week     Gets together: Never     Attends Adventism service: Never     Active member of club or organization: No     Attends meetings of clubs or organizations: Never     Relationship status: Living with partner    Intimate partner violence     Fear of current or ex partner: No     Emotionally abused: No     Physically abused: No     Forced sexual activity: No   Other Topics Concern    Not on file   Social History Narrative    Not on file       OBJECTIVE  There were no vitals filed for this visit  OBGyn Exam  Vulva: normal  Vagina: normal mucosa  Cervix: no lesions  Adnexa: normal adnexa  Uterus: normal size    ASSESSMENT AND PLAN    27 y o , [id number], with Ht 5' 9" (1 753 m)   Wt 104 kg (230 lb)   LMP 11/16/2020 (Exact Date) , at 11w5d here for her prenatal H&P   by bedside ultrasound    1  Pregnancy: H&P completed today  PN Labs reviewed today, it was within normal limits   Labor expectations discussed with patient, including appointment schedule, nutrition, weight gain, exercise, medications, sexual intercourse, and nausea/vomiting  2  Screening: Pap smear and GC/CT were collected   Sequential screening reviewed with patient - patient already scheduled with [facility]     3  Consents: Delivery process including potential Operative vaginal Delivery and  reviewed  Sign delivery consent form at 28 weeks  4  Labor: For analgesia, patient do not desire epidural, she had her previous babies without anesthesia   5  Postpartum: Patient plans on  breastfeeding  Different methods of contraception were discussed with patient, including progesterone only oral pills, depo provera, nexplanon, mirena, and paragard  Patient is unsure about contraception at this time  6  Follow up: RTC in 4 weeks  Precautions regarding labor, leakage, bleeding, and fetal movement reviewed      7  GBS bacteriuria : we have discussed she will need ppx antibiotic in her labor course    D/w Dr Martin Weathers MD  2/15/2021  11:28 AM

## 2021-02-19 ENCOUNTER — TELEPHONE (OUTPATIENT)
Dept: PERINATAL CARE | Facility: CLINIC | Age: 31
End: 2021-02-19

## 2021-02-19 LAB
C TRACH DNA SPEC QL NAA+PROBE: NEGATIVE
HPV HR 12 DNA CVX QL NAA+PROBE: NEGATIVE
HPV16 DNA CVX QL NAA+PROBE: NEGATIVE
HPV18 DNA CVX QL NAA+PROBE: NEGATIVE
LAB AP GYN PRIMARY INTERPRETATION: NORMAL
Lab: NORMAL
N GONORRHOEA DNA SPEC QL NAA+PROBE: NEGATIVE

## 2021-02-19 NOTE — TELEPHONE ENCOUNTER
Spoke with patient and confirmed appointment with [facility]  1 support person ( must be over age of 15) may accompany patient  Will you and your support person be able to wear a mask ,without a valve , during entire appointment? YES   To minimize your exposure in our waiting area,check in and rooming questions will be done via phone  When you arrive in the parking lot please call the following inside line # prior to entering office:    [facility]: [phone]    Have you or your support person traveled outside the state in the last 2 weeks? NO  If yes, what state did you travel to? Do you or your support person have:  Fever or flu- like symptoms? NO  Symptoms of upper respiratory infection like runny nose, sore throat or cough? NO  Do you have new headache that you have not had in the past?NO  Have you experienced any new shortness of breath recently? NO  Do you have any new loss of taste or smell? NO  Do you have any new diarrhea, nausea or vomiting? NO  Have you recently been in contact with anyone who has been sick or diagnosed with COVID-19 infection? NO  Have you been recommended to quarantine because of an exposure to a confirmed positive COVID19 person? NO  Have you recently been tested for COVID19? NO    Patient verbalized understanding of all instructions   -------------------------------------------------------------

## 2021-03-01 ENCOUNTER — TELEPHONE (OUTPATIENT)
Dept: PERINATAL CARE | Facility: OTHER | Age: 31
End: 2021-03-01

## 2021-03-01 NOTE — TELEPHONE ENCOUNTER
Attempted to reach patient by phone and left voicemail to confirm appointment for MFM ultrasound  1 support person ( must be over the age of 15) may accompany you for your appointment  Please wear masks ( [place] Dept of Health)  You and your support person will be screened upon arrival   IF not feeling well- cough, fever, shortness of breath or any flu like symptoms contact your primary care physician or [phone] Roxana Gibson  ? Please call our office prior to entering the building  Check in and rooming questions will be done via phone  Inside office # provided:  ?    Kiki Officer line: [phone]    ? [facility] does not allow cell phone use, recording device or streaming during ultrasound     Any questions with these instructions please call Maternal Fetal Medicine nurse line today @ # [phone]

## 2021-03-02 ENCOUNTER — ROUTINE PRENATAL (OUTPATIENT)
Dept: PERINATAL CARE | Facility: OTHER | Age: 31
End: 2021-03-02

## 2021-03-02 VITALS
HEART RATE: 76 BPM | DIASTOLIC BLOOD PRESSURE: 73 MMHG | HEIGHT: 69 IN | WEIGHT: 231 LBS | SYSTOLIC BLOOD PRESSURE: 111 MMHG | BODY MASS INDEX: 34.21 KG/M2

## 2021-03-02 DIAGNOSIS — Z36.89 ENCOUNTER TO ESTABLISH GESTATIONAL AGE USING ULTRASOUND: ICD-10-CM

## 2021-03-02 DIAGNOSIS — Z34.91 PREGNANT AND NOT YET DELIVERED IN FIRST TRIMESTER: ICD-10-CM

## 2021-03-02 DIAGNOSIS — O99.211 OBESITY AFFECTING PREGNANCY IN FIRST TRIMESTER: ICD-10-CM

## 2021-03-02 DIAGNOSIS — Z3A.13 13 WEEKS GESTATION OF PREGNANCY: Primary | ICD-10-CM

## 2021-03-02 PROCEDURE — 99203 OFFICE O/P NEW LOW 30 MIN: CPT | Performed by: OBSTETRICS & GYNECOLOGY

## 2021-03-02 PROCEDURE — 76801 OB US < 14 WKS SINGLE FETUS: CPT | Performed by: OBSTETRICS & GYNECOLOGY

## 2021-03-02 RX ORDER — ASPIRIN 81 MG/1
162 TABLET, CHEWABLE ORAL DAILY
Qty: 180 TABLET | Refills: 1 | Status: SHIPPED | OUTPATIENT
Start: 2021-03-02 | End: 2021-07-12

## 2021-03-02 NOTE — LETTER
March 2, 2021     [address] PROVIDER    Patient: Columba Parr   YOB: 1990   Date of Visit: 3/2/2021       Dear   Provider: Thank you for referring Columba Parr to me for evaluation  Below are my notes for this consultation  If you have questions, please do not hesitate to call me  I look forward to following your patient along with you  Sincerely,        Ju De La Cruz MD        CC: No Recipients  Ju De La Cruz MD  3/1/2021  1:13 PM  Sign when Signing Visit    Please refer to the [facility] ultrasound report in Ob Procedures for additional information regarding today's visit

## 2021-03-10 DIAGNOSIS — Z23 ENCOUNTER FOR IMMUNIZATION: ICD-10-CM

## 2021-03-15 ENCOUNTER — APPOINTMENT (OUTPATIENT)
Dept: LAB | Facility: HOSPITAL | Age: 31
End: 2021-03-15

## 2021-03-15 ENCOUNTER — ROUTINE PRENATAL (OUTPATIENT)
Dept: OBGYN CLINIC | Facility: CLINIC | Age: 31
End: 2021-03-15

## 2021-03-15 VITALS
BODY MASS INDEX: 34.07 KG/M2 | SYSTOLIC BLOOD PRESSURE: 113 MMHG | DIASTOLIC BLOOD PRESSURE: 72 MMHG | HEIGHT: 69 IN | HEART RATE: 87 BPM | WEIGHT: 230 LBS

## 2021-03-15 DIAGNOSIS — Z34.91 PREGNANT AND NOT YET DELIVERED IN FIRST TRIMESTER: ICD-10-CM

## 2021-03-15 DIAGNOSIS — Z3A.15 15 WEEKS GESTATION OF PREGNANCY: Primary | ICD-10-CM

## 2021-03-15 DIAGNOSIS — Z3A.09 9 WEEKS GESTATION OF PREGNANCY: ICD-10-CM

## 2021-03-15 LAB
SL AMB  POCT GLUCOSE, UA: NORMAL
SL AMB POCT KETONES,UA: NORMAL
SL AMB POCT URINE PROTEIN: NORMAL

## 2021-03-15 PROCEDURE — 84702 CHORIONIC GONADOTROPIN TEST: CPT

## 2021-03-15 PROCEDURE — 82677 ASSAY OF ESTRIOL: CPT

## 2021-03-15 PROCEDURE — 82105 ALPHA-FETOPROTEIN SERUM: CPT

## 2021-03-15 PROCEDURE — 81002 URINALYSIS NONAUTO W/O SCOPE: CPT | Performed by: OBSTETRICS & GYNECOLOGY

## 2021-03-15 PROCEDURE — 86336 INHIBIN A: CPT

## 2021-03-15 PROCEDURE — 99213 OFFICE O/P EST LOW 20 MIN: CPT | Performed by: OBSTETRICS & GYNECOLOGY

## 2021-03-17 LAB
2ND TRIMESTER 4 SCREEN SERPL-IMP: NORMAL
2ND TRIMESTER 4 SCREEN SERPL-IMP: NORMAL
AFP ADJ MOM SERPL: 1.54
AFP SERPL-MCNC: 37.8 NG/ML
AGE AT DELIVERY: 32.5 YR
FET TS 18 RISK FROM MAT AGE: NORMAL
FET TS 21 RISK FROM MAT AGE: 485
GA METHOD: NORMAL
GA: 15.7 WEEKS
HCG ADJ MOM SERPL: 0.96
HCG SERPL-ACNC: NORMAL MIU/ML
IDDM PATIENT QL: NO
INHIBIN A ADJ MOM SERPL: 0.9
INHIBIN A SERPL-MCNC: 119.92 PG/ML
KARYOTYP BLD/T: NORMAL
MULTIPLE PREGNANCY: NO
NEURAL TUBE DEFECT RISK FETUS: 2457 %
SERVICE CMNT-IMP: NORMAL
TS 18 RISK FETUS: NORMAL
TS 21 RISK FETUS: NORMAL
U ESTRIOL ADJ MOM SERPL: 1.67
U ESTRIOL SERPL-MCNC: 1.34 NG/ML

## 2021-03-17 NOTE — PROGRESS NOTES
OB/GYN prenatal visit    S: 27 y o  [id number] 15w5d here for PN visit  She has no obstetric complaints, including pelvic pain, contractions, vaginal bleeding, loss of fluid, or decreased fetal movement       O:  Vitals:    03/15/21 1100   BP: 113/72   Pulse: 87       Gen: no acute distress, nonlabored breathing     Fetal Heart Rate: 160    A/P:      IUP at 15w5d  No obstetric complaints today  [facility] 3/2/21 wnl, recommend prophylaxis with 162mg of aspirin a day; weekly NST for BMI   TWG: + 0 (recommended weight gain 11-20pounds)  Flu vaccine none, TDAP vaccine due at 28 weeks  Contraception: patient is still uncertain  Breastfeeding: yes  Birth plan: no epidural   GBS bacteruria: will need penicillin during labor  Discussed  labor precautions and fetal kick counts    Return to clinic in 4 weeks    COVID 19 precautions were discussed with patient at length, reviewed symptoms, hygiene, social distancing, patient to call office  with questions/concerns    Kunal Bean MD  3/15/2021  5:30 PM

## 2021-03-29 ENCOUNTER — IMMUNIZATIONS (OUTPATIENT)
Dept: FAMILY MEDICINE CLINIC | Facility: HOSPITAL | Age: 31
End: 2021-03-29

## 2021-03-29 DIAGNOSIS — Z23 ENCOUNTER FOR IMMUNIZATION: Primary | ICD-10-CM

## 2021-03-29 PROCEDURE — 91300 SARS-COV-2 / COVID-19 MRNA VACCINE (PFIZER-BIONTECH) 30 MCG: CPT

## 2021-03-29 PROCEDURE — 0001A SARS-COV-2 / COVID-19 MRNA VACCINE (PFIZER-BIONTECH) 30 MCG: CPT

## 2021-04-09 PROBLEM — Z3A.19 19 WEEKS GESTATION OF PREGNANCY: Status: ACTIVE | Noted: 2021-04-09

## 2021-04-09 PROBLEM — Z34.90 SUPERVISION OF NORMAL PREGNANCY: Status: ACTIVE | Noted: 2021-04-09

## 2021-04-09 PROBLEM — R82.71 GBS BACTERIURIA: Status: ACTIVE | Noted: 2021-04-09

## 2021-04-09 PROBLEM — O99.210 OBESITY IN PREGNANCY: Status: ACTIVE | Noted: 2021-04-09

## 2021-04-09 NOTE — PROGRESS NOTES
OB/GYN prenatal visit    S: 27 y o  [id number] 19w5d here for PN visit  She has no obstetric complaints, including pelvic pain, contractions, vaginal bleeding, loss of fluid, or decreased fetal movement  O:  Vitals:    21 1100   BP: 117/56   Pulse: 85       Gen: no acute distress, nonlabored breathing     Fetal Heart Rate: 150    A/P:      IUP at 19w5d  No obstetric complaints today  [address]  [facility] 3/2/21 wnl, Level II scheduled 21  TWG: + 1lb (recommended weight gain 11-20lbs)  Flu vaccine none, TDAP vaccine at 28 weeks  Contraception: Is considering an IUD   Discussed [facility] program  Breastfeeding: yes  Birth plan: no epidural  Discussed  labor precautions and fetal kick counts      Obesity in pregnancy  Aware of risks in pregnancy, will follow up with MFM for recommendations for further monitoring    GBS bacteruria  Aware will need penicillin in labor    Return to clinic in 4 weeks      COVID 19 precautions were discussed with patient at length, reviewed symptoms, hygiene, social distancing, patient to call office  with questions/concerns    Discussed with Dr King Moon MD  2021  11:35 AM

## 2021-04-12 ENCOUNTER — ROUTINE PRENATAL (OUTPATIENT)
Dept: OBGYN CLINIC | Facility: CLINIC | Age: 31
End: 2021-04-12

## 2021-04-12 VITALS
HEART RATE: 85 BPM | BODY MASS INDEX: 34.21 KG/M2 | HEIGHT: 69 IN | SYSTOLIC BLOOD PRESSURE: 117 MMHG | WEIGHT: 231 LBS | DIASTOLIC BLOOD PRESSURE: 56 MMHG

## 2021-04-12 DIAGNOSIS — Z34.82 ENCOUNTER FOR SUPERVISION OF OTHER NORMAL PREGNANCY IN SECOND TRIMESTER: ICD-10-CM

## 2021-04-12 DIAGNOSIS — O99.210 OBESITY IN PREGNANCY: ICD-10-CM

## 2021-04-12 DIAGNOSIS — Z3A.19 19 WEEKS GESTATION OF PREGNANCY: Primary | ICD-10-CM

## 2021-04-12 DIAGNOSIS — R82.71 GBS BACTERIURIA: ICD-10-CM

## 2021-04-12 LAB
SL AMB  POCT GLUCOSE, UA: NORMAL
SL AMB POCT KETONES,UA: NORMAL
SL AMB POCT URINE PROTEIN: NORMAL

## 2021-04-12 PROCEDURE — 99213 OFFICE O/P EST LOW 20 MIN: CPT | Performed by: OBSTETRICS & GYNECOLOGY

## 2021-04-12 PROCEDURE — 81002 URINALYSIS NONAUTO W/O SCOPE: CPT | Performed by: OBSTETRICS & GYNECOLOGY

## 2021-04-19 ENCOUNTER — ROUTINE PRENATAL (OUTPATIENT)
Dept: PERINATAL CARE | Facility: CLINIC | Age: 31
End: 2021-04-19

## 2021-04-19 VITALS
BODY MASS INDEX: 34.75 KG/M2 | WEIGHT: 234.6 LBS | HEIGHT: 69 IN | DIASTOLIC BLOOD PRESSURE: 60 MMHG | HEART RATE: 91 BPM | SYSTOLIC BLOOD PRESSURE: 128 MMHG

## 2021-04-19 DIAGNOSIS — Z36.86 ENCOUNTER FOR ANTENATAL SCREENING FOR CERVICAL LENGTH: ICD-10-CM

## 2021-04-19 DIAGNOSIS — Z3A.20 20 WEEKS GESTATION OF PREGNANCY: ICD-10-CM

## 2021-04-19 DIAGNOSIS — Z36.3 ENCOUNTER FOR ANTENATAL SCREENING FOR MALFORMATION USING ULTRASOUND: Primary | ICD-10-CM

## 2021-04-19 PROCEDURE — 76817 TRANSVAGINAL US OBSTETRIC: CPT | Performed by: OBSTETRICS & GYNECOLOGY

## 2021-04-19 PROCEDURE — 76805 OB US >/= 14 WKS SNGL FETUS: CPT | Performed by: OBSTETRICS & GYNECOLOGY

## 2021-04-19 PROCEDURE — 99213 OFFICE O/P EST LOW 20 MIN: CPT | Performed by: OBSTETRICS & GYNECOLOGY

## 2021-04-19 NOTE — PROGRESS NOTES
The patient was seen today for an ultrasound  Please see ultrasound report (located under Ob Procedures) for additional details  Thank you very much for allowing us to participate in the care of this very nice patient  Should you have any questions, please do not hesitate to contact me  Shayan Harkins MD [address] [facility]  Attending Physician, [place]

## 2021-04-21 ENCOUNTER — IMMUNIZATIONS (OUTPATIENT)
Dept: FAMILY MEDICINE CLINIC | Facility: HOSPITAL | Age: 31
End: 2021-04-21

## 2021-04-21 DIAGNOSIS — Z23 ENCOUNTER FOR IMMUNIZATION: Primary | ICD-10-CM

## 2021-04-21 PROCEDURE — 91300 SARS-COV-2 / COVID-19 MRNA VACCINE (PFIZER-BIONTECH) 30 MCG: CPT

## 2021-04-21 PROCEDURE — 0002A SARS-COV-2 / COVID-19 MRNA VACCINE (PFIZER-BIONTECH) 30 MCG: CPT

## 2021-05-12 ENCOUNTER — ROUTINE PRENATAL (OUTPATIENT)
Dept: OBGYN CLINIC | Facility: CLINIC | Age: 31
End: 2021-05-12

## 2021-05-12 VITALS
WEIGHT: 238 LBS | HEART RATE: 99 BPM | SYSTOLIC BLOOD PRESSURE: 126 MMHG | BODY MASS INDEX: 35.25 KG/M2 | HEIGHT: 69 IN | DIASTOLIC BLOOD PRESSURE: 59 MMHG

## 2021-05-12 DIAGNOSIS — Z3A.24 24 WEEKS GESTATION OF PREGNANCY: Primary | ICD-10-CM

## 2021-05-12 LAB
SL AMB  POCT GLUCOSE, UA: NORMAL
SL AMB POCT URINE PROTEIN: NORMAL

## 2021-05-12 PROCEDURE — 99213 OFFICE O/P EST LOW 20 MIN: CPT | Performed by: OBSTETRICS & GYNECOLOGY

## 2021-05-12 PROCEDURE — 81002 URINALYSIS NONAUTO W/O SCOPE: CPT | Performed by: OBSTETRICS & GYNECOLOGY

## 2021-05-12 NOTE — PROGRESS NOTES
OB/GYN  PN Visit  Gris Bolton  [id number]  2021  2:41 PM  Dr Velvet Kayser, MD    S: 27 y o  [id number] 24w0d here for PN visit  She has NO obstetric complaints, including pelvic pain, contractions, vaginal bleeding, loss of fluid, or decreased fetal movement  O:  Vitals:    21 1414   BP: 126/59   Pulse: 99         Physical examination   Cardio-pulmonar  Normal vesicular murmur, no rales,  nonlabored breathing , normal S1/S2 no cardiac murmurs , no gallops   Abdomen  Soft , no tender, no signs or peritoneal irritation   Extremities  mobiles no edemas  Fundal height: 29      A/P:    Problem List Items Addressed This Visit     None      Visit Diagnoses     24 weeks gestation of pregnancy    -  Primary    Relevant Orders    POCT urine dip (Completed)    RPR    Glucose, 1H PG    CBC and Platelet          D 27 y o  [id number] 24w0d  here for prenatal care without obstetric complaints today  In this visit we addressed:    IUP at 24 weeks 0  days  Prenatal panel 21 HIV negative, RPR negative, Rubella immune, hep B negative  CL/GC negative 2/15/21  Quad-screen negative 3/15/21  Last US 21 Vertex, EFW 468g, placenta anterior  We requested her 28 weeks labs   Tdap vaccine to be provided in next visit  Delivery consent to be signed in next visit     Birth plan:   , she would not like Epidural in her labor  Breastfeeding: yes  Contraception: considering IUD  Discussed  labor precautions and fetal kick counts    Return to clinic in 4 weeks    GBS bacteriuria  Patient required treatment with penicillin in labor  Without allergies    Maternal obesity   Complications associated  with increase BMI  were discussed with patient as hypertensive disorders of pregnancy, gestational diabetes, fetal demise,  birth, fetal macrosomia  serious cardiac, respiratory and hematologic conditions, thromboembolic events and anesthesia related complications      Patient evaluated with  Katie Cool MD  5/12/2021  2:41 PM

## 2021-06-02 ENCOUNTER — APPOINTMENT (OUTPATIENT)
Dept: LAB | Facility: HOSPITAL | Age: 31
End: 2021-06-02

## 2021-06-02 DIAGNOSIS — Z3A.24 24 WEEKS GESTATION OF PREGNANCY: ICD-10-CM

## 2021-06-02 LAB
ERYTHROCYTE [DISTWIDTH] IN BLOOD BY AUTOMATED COUNT: 13.2 % (ref 11.6–15.1)
GLUCOSE 1H P 50 G GLC PO SERPL-MCNC: 185 MG/DL (ref 40–134)
HCT VFR BLD AUTO: 30.4 % (ref 34.8–46.1)
HGB BLD-MCNC: 9.8 G/DL (ref 11.5–15.4)
MCH RBC QN AUTO: 28.2 PG (ref 26.8–34.3)
MCHC RBC AUTO-ENTMCNC: 32.2 G/DL (ref 31.4–37.4)
MCV RBC AUTO: 88 FL (ref 82–98)
PLATELET # BLD AUTO: 189 THOUSANDS/UL (ref 149–390)
PMV BLD AUTO: 10.9 FL (ref 8.9–12.7)
RBC # BLD AUTO: 3.47 MILLION/UL (ref 3.81–5.12)
WBC # BLD AUTO: 9.46 THOUSAND/UL (ref 4.31–10.16)

## 2021-06-02 PROCEDURE — 86592 SYPHILIS TEST NON-TREP QUAL: CPT

## 2021-06-02 PROCEDURE — 85027 COMPLETE CBC AUTOMATED: CPT

## 2021-06-02 PROCEDURE — 82950 GLUCOSE TEST: CPT

## 2021-06-02 PROCEDURE — 36415 COLL VENOUS BLD VENIPUNCTURE: CPT

## 2021-06-03 DIAGNOSIS — D64.9 ANEMIA: ICD-10-CM

## 2021-06-03 DIAGNOSIS — R73.02 GLUCOSE INTOLERANCE (IMPAIRED GLUCOSE TOLERANCE): Primary | ICD-10-CM

## 2021-06-03 LAB — RPR SER QL: NORMAL

## 2021-06-03 RX ORDER — DOCUSATE SODIUM 100 MG/1
100 CAPSULE, LIQUID FILLED ORAL 2 TIMES DAILY
Qty: 10 CAPSULE | Refills: 0 | Status: SHIPPED | OUTPATIENT
Start: 2021-06-03 | End: 2021-08-16

## 2021-06-03 RX ORDER — FERROUS SULFATE TAB EC 324 MG (65 MG FE EQUIVALENT) 324 (65 FE) MG
324 TABLET DELAYED RESPONSE ORAL
Qty: 30 TABLET | Refills: 3 | Status: SHIPPED | OUTPATIENT
Start: 2021-06-03 | End: 2021-08-16

## 2021-06-04 ENCOUNTER — TELEPHONE (OUTPATIENT)
Dept: OBGYN CLINIC | Facility: CLINIC | Age: 31
End: 2021-06-04

## 2021-06-04 NOTE — TELEPHONE ENCOUNTER
----- Message from Braden Ventura MD sent at 6/3/2021 10:18 PM EDT -----  Please inform patient her 1h gtt is elevated  I have ordered a 3 h gtt for her to get done in the closest lab  In addition she has anemia: I have ordered iron po and colace     Thanks

## 2021-06-07 ENCOUNTER — ROUTINE PRENATAL (OUTPATIENT)
Dept: OBGYN CLINIC | Facility: CLINIC | Age: 31
End: 2021-06-07

## 2021-06-07 VITALS
SYSTOLIC BLOOD PRESSURE: 119 MMHG | HEIGHT: 69 IN | DIASTOLIC BLOOD PRESSURE: 57 MMHG | BODY MASS INDEX: 35.84 KG/M2 | WEIGHT: 242 LBS | HEART RATE: 95 BPM

## 2021-06-07 DIAGNOSIS — Z3A.27 27 WEEKS GESTATION OF PREGNANCY: ICD-10-CM

## 2021-06-07 DIAGNOSIS — Z34.93 PRENATAL CARE IN THIRD TRIMESTER: Primary | ICD-10-CM

## 2021-06-07 DIAGNOSIS — Z23 NEED FOR TDAP VACCINATION: ICD-10-CM

## 2021-06-07 DIAGNOSIS — O99.012 ANEMIA AFFECTING PREGNANCY IN SECOND TRIMESTER: ICD-10-CM

## 2021-06-07 DIAGNOSIS — R82.71 GBS BACTERIURIA: ICD-10-CM

## 2021-06-07 DIAGNOSIS — O99.210 OBESITY IN PREGNANCY: ICD-10-CM

## 2021-06-07 DIAGNOSIS — Z34.82 ENCOUNTER FOR SUPERVISION OF OTHER NORMAL PREGNANCY IN SECOND TRIMESTER: ICD-10-CM

## 2021-06-07 PROBLEM — O99.019 ANEMIA AFFECTING PREGNANCY: Status: ACTIVE | Noted: 2021-06-07

## 2021-06-07 PROBLEM — Z34.92 ENCOUNTER FOR SUPERVISION OF NORMAL PREGNANCY IN SECOND TRIMESTER: Status: ACTIVE | Noted: 2021-04-09

## 2021-06-07 LAB
AMORPH URATE CRY URNS QL MICRO: ABNORMAL /HPF
BACTERIA UR QL AUTO: ABNORMAL /HPF
BILIRUB UR QL STRIP: NEGATIVE
CLARITY UR: ABNORMAL
COLOR UR: ABNORMAL
GLUCOSE UR STRIP-MCNC: NEGATIVE MG/DL
HGB UR QL STRIP.AUTO: ABNORMAL
KETONES UR STRIP-MCNC: ABNORMAL MG/DL
LEUKOCYTE ESTERASE UR QL STRIP: ABNORMAL
NITRITE UR QL STRIP: NEGATIVE
NON-SQ EPI CELLS URNS QL MICRO: ABNORMAL /HPF
PH UR STRIP.AUTO: 6 [PH]
PROT UR STRIP-MCNC: ABNORMAL MG/DL
RBC #/AREA URNS AUTO: ABNORMAL /HPF
SL AMB  POCT GLUCOSE, UA: NORMAL
SL AMB POCT KETONES,UA: NORMAL
SL AMB POCT NITRITE,UA: POSITIVE
SL AMB POCT URINE PROTEIN: NORMAL
SP GR UR STRIP.AUTO: 1.03 (ref 1–1.03)
UROBILINOGEN UR QL STRIP.AUTO: 0.2 E.U./DL
WBC #/AREA URNS AUTO: ABNORMAL /HPF

## 2021-06-07 PROCEDURE — 81002 URINALYSIS NONAUTO W/O SCOPE: CPT | Performed by: OBSTETRICS & GYNECOLOGY

## 2021-06-07 PROCEDURE — 90715 TDAP VACCINE 7 YRS/> IM: CPT | Performed by: OBSTETRICS & GYNECOLOGY

## 2021-06-07 PROCEDURE — 87086 URINE CULTURE/COLONY COUNT: CPT | Performed by: OBSTETRICS & GYNECOLOGY

## 2021-06-07 PROCEDURE — 87186 SC STD MICRODIL/AGAR DIL: CPT | Performed by: OBSTETRICS & GYNECOLOGY

## 2021-06-07 PROCEDURE — 81001 URINALYSIS AUTO W/SCOPE: CPT | Performed by: OBSTETRICS & GYNECOLOGY

## 2021-06-07 PROCEDURE — 99213 OFFICE O/P EST LOW 20 MIN: CPT | Performed by: OBSTETRICS & GYNECOLOGY

## 2021-06-07 PROCEDURE — 90471 IMMUNIZATION ADMIN: CPT | Performed by: OBSTETRICS & GYNECOLOGY

## 2021-06-07 PROCEDURE — 87077 CULTURE AEROBIC IDENTIFY: CPT | Performed by: OBSTETRICS & GYNECOLOGY

## 2021-06-07 NOTE — PROGRESS NOTES
OB/GYN prenatal visit    S: 27 y o  [id number] 27w5d here for PN visit  She has no obstetric complaints, including pelvic pain, contractions, vaginal bleeding, loss of fluid, or decreased fetal movement  O:  Vitals:    06/07/21 1000   BP: 119/57   Pulse: 95   Weight: 110 kg (242 lb)   Height: 5' 9" (1 753 m)       TWG:   Gen: no acute distress, nonlabored breathing  Cardio: RRR, S1/S2 normal   Lungs: CTAB, good effort   Abd: soft, nontender  Gravid uterus  Fundal Height (cm): 27 cm  Fetal Heart Rate: 150    A/P:  IUP @ 27w5d   - Delivery consent signed today   - Tdap administered today   -  g (4/19); f/u growth 7/12/21  - 28 week labs reviewed   - Continue PNV  - BP's WNL  - Genetic screening WNL 3/15/21     Abnormal 1 hr GTT  - 185  - 3 hr GTT pending; reminded pt     GBS bacteruria  - Will need PCN in labor; PCN non-allergic     Anemia  - Hgb 9 8 (6/2)  - Continue PO iron   - Repeat CBC in 1 month     Obesity in Pregnancy   - Will follow [facility] recommendations for monitoring in pregnancy     Postpartum Contraception  - Interested in IUD, discussed postpartum v post-placental IUD   - Will offer [facility] program application    RTC in 2 weeks for routine prenatal care      Katie Abreu MD  6/7/2021  12:41 PM

## 2021-06-07 NOTE — PATIENT INSTRUCTIONS
El embarazo de la semana 27 a la 30   CUIDADO AMBULATORIO:   Qué cambios están ocurriendo en mireles cuerpo: Usted podría notar síntomas nuevos tri falta de [place] o inflamación de lowell tobillos y pies  Es posible que también tenga dificultad para dormir o contracciones  Busque atención médica de inmediato si:  · Usted presenta un tucker dolor de alanna que no desaparece  · Usted tiene cambios en la visión nuevos o en aumento, tri visión borrosa o con manchas  · Usted tiene inflamación nueva o creciente en mireles navid o sanna  · Usted tiene manchado o sangrado vaginal     · Usted rompe ines o siente un chorro o gotas de agua tibia que le está bajando por mireles vagina  Comuníquese con mireles médico si:  · Usted tiene más de 5 contracciones en 1 hora  · Usted nota algún cambio en los movimientos de mireles bebé  · Usted tiene calambres, presión o tensión abdominal     · Usted tiene un cambio en la secreción vaginal     · Usted tiene escalofríos o fiebre  · Usted tiene comezón, ardor o dolor vaginal     · Usted tiene sofia secreción vaginal amarillenta, verdosa, tiny o de [employer]  · Usted tiene dolor o ardor al [place], orina menos de lo habitual o tiene [place] rosada o sanguinolenta  · Usted tiene preguntas o inquietudes acerca de mireles condición o cuidado  Cómo cuidarse en esta etapa de mireles embarazo:  · Consuma alimentos saludables y variados  Alimentos saludables incluyen frutas, verduras, panes de babar integral, alimentos lácteos bajos en grasa, frijoles, shahida magras y pescado  [place] líquidos tri se le haya indicado  Pregunte cuánto líquido debe cortney cada día y cuáles líquidos son los más adecuados para usted  Limite el consumo de cafeína a menos de [place] [address]  Limite el consumo de pescado a 2 porciones cada semana  Escoja pescado con concentraciones bajas de elan tri atún al natural enlatado, camarón, salmón, bacalao o tilapia   No coma pescado con concentraciones altas de elan tri pez veronique, caballa gigante, pargo rayado y tiburón  · Controle la acidez comiendo 4 o 5 comidas pequeñas cada día en vez de comidas grandes  Evite los alimentos picantes  · Controle la inflamación al [facility] y poner los pies en alto o elevados sobre [place]  · [id number] [place]  Mireles necesidad de ciertas vitaminas y [facility], tri el ácido fólico, aumenta medhat el [facility]  Las vitaminas prenatales proporcionan algunas de las vitaminas y minerales adicionales que usted necesita  Las vitaminas prenatales también podrían ayudar a disminuir el riesgo de ciertos defectos de nacimiento  · Consulte con mireles médico acerca de hacer ejercicio  El ejercicio moderado puede ayudarla a mantenerse en forma  Mireles médico la ayudará a planear un programa de ejercicios que sea seguro para usted medhat mireles [facility]  · No fume  Fumar aumenta el riesgo de aborto espontáneo y otros problemas de ericka medhat mireles [facility]  Fumar puede causar que mireles bebé nazca antes de tiempo o que pese menos al nacer  Solicite información a mireles médico si usted necesita ayuda para dejar de fumar  · No consuma alcohol  El alcohol pasa de mireles cuerpo al bebé a través de la placenta  Puede afectar el desarrollo del cerebro de mireles bebé y provocar el síndrome de alcoholismo fetal (SAF)  El SAF es un frank de condiciones que causan problemas [place], de comportamiento y de crecimiento  · Consulte con mireles médico antes de cortney cualquier medicamento  Muchos medicamentos pueden perjudicar a mireles bebé si usted los dipesh [address] [place] [address]  No tome ningún medicamento, vitaminas, hierbas o suplementos sin yuni consultar con mireles Lavenia Colby  use drogas ilegales o de la [place] (tri marihuana o cocaína) mientras está embarazada  Consejos de seguridad medhat el embarazo:  · Evite jacuzzis y saunas   No use un jacuzzi o un sauna mientras usted está Missy Skjo[place], especialmente medhat el primer trimestre  Los [facility] y los saunas aumentan la temperatura de mireles bebé y el riesgo de defectos de nacimiento  · Evite la toxoplasmosis  [place] es sofia infección causada por comer carne cruda o estar cerca del excremento de un ying infectado  [place] puede causar malformaciones congénitas, aborto espontáneo y Tito Schein  Lávese las sanna después de tocar carne cruda  Asegúrese de que la carne esté corbin cocida antes de comerla  Evite los huevos crudos y la [place]  Use guantes o pida que alguien la ayude a limpiar la caja de arena del ying mientras usted Marisa Baar  Cambios que están ocurriendo con mireles bebé: Para las 30 semanas, mireles bebé podría pesar más de 3 libras  Mireles bebé podría medir alrededor de 11 pulgadas de sony desde la punta de la alanna hasta la rabadilla (parte inferior del bebé)  Ahora mireles bebé puede abrir y cerrar lowell ojos  Las patadas y movimientos de mireles bebé son más natasha en roxane momento  Lo que necesita saber acerca del cuidado prenatal: Mireles médico le revisará mireles presión arterial y [place]  Es posible que también necesite lo siguiente:  · Los análisis de she podrían realizarse para revisar signos de anemia o el tipo de [place]  · Un examen de orina también podría realizarse para revisarle el azúcar y la proteína  Estas son señales de diabetes gestacional o de infección  La proteína en mireles orina también podría ser sofia señal de preeclampsia  La preeclampsia es sofia condición que puede desarrollarse medhat la semana 21 o después en mireles embarazo  Esta provoca presión arterial steve y [employer] con lowell riñones y [place]  · Sofia vacuna contra difteria, tétanos y tos ferina y vacuna contra la gripe podría ser recomendado por mireles médico     · La detección de diabetes gestacional se realizará usando sofia prueba oral de tolerancia a la glucosa   Sofia prueba oral de tolerancia a la glucosa comienza con sofia revisión de los niveles de azúcar en la [place] después de que usted no haya comido por 8 horas  Después se le da sofia bebida de glucosa  Le revisan el nivel de azúcar en la she después de 1 hora, 2 horas y algunas veces 3 horas  Los médicos analizarán si el nivel de azúcar en la she aumenta después del primer análisis  · La altura uterina es sofia medición del útero para controlar el desarrollo de mireles bebé  [employer] por lo general es igual al número de [facility] que usted tiene de embarazo  Mireles médico también podría revisar la posición de mireles bebé  · El ritmo cardíaco de mireles bebé será revisado  © Copyright [facility] [address] Information is for End User's use only and may not be sold, redistributed or otherwise used for commercial purposes  All illustrations and images included in CareNotes® are the copyrighted property of [employer] D A [employer]  or [address] [place] [address] es sólo para uso en educación  Mireles intención no es darle un consejo médico sobre enfermedades o tratamientos  Colsulte con mireles Classie Guillen farmacéutico antes de seguir cualquier régimen médico para saber si es seguro y efectivo para usted

## 2021-06-08 ENCOUNTER — APPOINTMENT (OUTPATIENT)
Dept: LAB | Facility: HOSPITAL | Age: 31
End: 2021-06-08

## 2021-06-08 DIAGNOSIS — O99.891 BACTERIURIA, ASYMPTOMATIC IN PREGNANCY: Primary | ICD-10-CM

## 2021-06-08 DIAGNOSIS — O99.012 ANEMIA AFFECTING PREGNANCY IN SECOND TRIMESTER: ICD-10-CM

## 2021-06-08 DIAGNOSIS — R82.71 BACTERIURIA, ASYMPTOMATIC IN PREGNANCY: Primary | ICD-10-CM

## 2021-06-08 DIAGNOSIS — R73.02 GLUCOSE INTOLERANCE (IMPAIRED GLUCOSE TOLERANCE): ICD-10-CM

## 2021-06-08 DIAGNOSIS — Z34.82 ENCOUNTER FOR SUPERVISION OF OTHER NORMAL PREGNANCY IN SECOND TRIMESTER: ICD-10-CM

## 2021-06-08 LAB
ERYTHROCYTE [DISTWIDTH] IN BLOOD BY AUTOMATED COUNT: 13.4 % (ref 11.6–15.1)
GLUCOSE 1H P 100 G GLC PO SERPL-MCNC: 151 MG/DL (ref 65–179)
GLUCOSE 2H P 100 G GLC PO SERPL-MCNC: 111 MG/DL (ref 65–154)
GLUCOSE 3H P 100 G GLC PO SERPL-MCNC: 112 MG/DL (ref 65–139)
GLUCOSE P FAST SERPL-MCNC: 82 MG/DL (ref 65–99)
HCT VFR BLD AUTO: 30.5 % (ref 34.8–46.1)
HGB BLD-MCNC: 9.9 G/DL (ref 11.5–15.4)
MCH RBC QN AUTO: 28.3 PG (ref 26.8–34.3)
MCHC RBC AUTO-ENTMCNC: 32.5 G/DL (ref 31.4–37.4)
MCV RBC AUTO: 87 FL (ref 82–98)
PLATELET # BLD AUTO: 188 THOUSANDS/UL (ref 149–390)
PMV BLD AUTO: 10.8 FL (ref 8.9–12.7)
RBC # BLD AUTO: 3.5 MILLION/UL (ref 3.81–5.12)
WBC # BLD AUTO: 10.39 THOUSAND/UL (ref 4.31–10.16)

## 2021-06-08 PROCEDURE — 82952 GTT-ADDED SAMPLES: CPT

## 2021-06-08 PROCEDURE — 85027 COMPLETE CBC AUTOMATED: CPT

## 2021-06-08 PROCEDURE — 36415 COLL VENOUS BLD VENIPUNCTURE: CPT

## 2021-06-08 PROCEDURE — 82951 GLUCOSE TOLERANCE TEST (GTT): CPT

## 2021-06-08 RX ORDER — CEPHALEXIN 500 MG/1
500 CAPSULE ORAL EVERY 6 HOURS SCHEDULED
Qty: 28 CAPSULE | Refills: 0 | Status: SHIPPED | OUTPATIENT
Start: 2021-06-08 | End: 2021-06-15

## 2021-06-09 LAB — BACTERIA UR CULT: ABNORMAL

## 2021-06-09 NOTE — PROGRESS NOTES
Pt has bacteruria from recent urine culture, > 100K gram negative cynthia  Will send Keflex 500 mg Q6h x 7d for treatment  Will await culture sensitivities

## 2021-06-10 ENCOUNTER — TELEPHONE (OUTPATIENT)
Dept: OBGYN CLINIC | Facility: CLINIC | Age: 31
End: 2021-06-10

## 2021-06-10 NOTE — TELEPHONE ENCOUNTER
----- Message from Mesfin Dutta MD sent at 6/8/2021 10:30 PM EDT -----  Pt has bacteria present in urine  She will need antibiotics for treatment  Will send Kelfex 500 mg q6h x 7 days to her pharmacy  In addition, her 3 hour sugar test was normal  Her CBC that she completed recently was supposed to be completed in 1 month - still low, she should continue PO iron  Please notify patient of the above

## 2021-06-29 ENCOUNTER — ROUTINE PRENATAL (OUTPATIENT)
Dept: OBGYN CLINIC | Facility: CLINIC | Age: 31
End: 2021-06-29

## 2021-06-29 VITALS
BODY MASS INDEX: 37.33 KG/M2 | DIASTOLIC BLOOD PRESSURE: 62 MMHG | WEIGHT: 252 LBS | SYSTOLIC BLOOD PRESSURE: 125 MMHG | HEART RATE: 99 BPM | HEIGHT: 69 IN

## 2021-06-29 DIAGNOSIS — O99.012 ANEMIA AFFECTING PREGNANCY IN SECOND TRIMESTER: ICD-10-CM

## 2021-06-29 DIAGNOSIS — R82.71 GBS BACTERIURIA: ICD-10-CM

## 2021-06-29 DIAGNOSIS — Z34.93 PRENATAL CARE IN THIRD TRIMESTER: Primary | ICD-10-CM

## 2021-06-29 PROBLEM — N91.2 AMENORRHEA: Status: RESOLVED | Noted: 2021-01-13 | Resolved: 2021-06-29

## 2021-06-29 PROBLEM — Z34.92 ENCOUNTER FOR SUPERVISION OF NORMAL PREGNANCY IN SECOND TRIMESTER: Status: RESOLVED | Noted: 2021-04-09 | Resolved: 2021-06-29

## 2021-06-29 PROBLEM — Z3A.27 27 WEEKS GESTATION OF PREGNANCY: Status: RESOLVED | Noted: 2021-04-09 | Resolved: 2021-06-29

## 2021-06-29 PROCEDURE — 99213 OFFICE O/P EST LOW 20 MIN: CPT | Performed by: OBSTETRICS & GYNECOLOGY

## 2021-06-29 NOTE — ASSESSMENT & PLAN NOTE
No obstetric complaints today  PNC US  21  grams - 1 lbs 1 oz (88%)  TWG: +20  S/p TDAP vaccine   Contraception: IUD to be placed at 6 weeks   Breastfeeding: yes   Birth plan: 2 prior SVDs w/o epidural  Discussed  labor precautions and fetal kick counts    Return to clinic in 4 weeks  S/p COVID Vaccine

## 2021-06-29 NOTE — PROGRESS NOTES
OB/GYN prenatal visit    S: 27 y o  [id number] 30w6d here for PN visit  She has no  obstetric complaints, including pelvic pain, contractions, vaginal bleeding, loss of fluid, or decreased fetal movement  O:  Vitals:    21 1407   BP: 125/62   Pulse: 99       Gen: no acute distress, nonlabored breathing  FHT: 145  FH: 29cm          A/P:    Problem List Items Addressed This Visit        Other    GBS bacteriuria     Will require PCN in labor            Anemia affecting pregnancy     28 week Hb: 9 9             Prenatal care in third trimester - Primary     No obstetric complaints today  [facility]  [facility]  grams - 1 lbs 1 oz (88%)  TWG: +20  S/p TDAP vaccine   Contraception: IUD to be placed at 6 weeks   Breastfeeding: yes   Birth plan: 2 prior SVDs w/o epidural  Discussed  labor precautions and fetal kick counts    Return to clinic in 4 weeks  S/p COVID Vaccine            Relevant Orders    UA w Reflex to Microscopic w Reflex to Culture              Homero Ritter MD  2021  2:22 PM

## 2021-07-12 ENCOUNTER — ROUTINE PRENATAL (OUTPATIENT)
Dept: OBGYN CLINIC | Facility: CLINIC | Age: 31
End: 2021-07-12

## 2021-07-12 ENCOUNTER — APPOINTMENT (OUTPATIENT)
Dept: LAB | Facility: HOSPITAL | Age: 31
End: 2021-07-12

## 2021-07-12 ENCOUNTER — ULTRASOUND (OUTPATIENT)
Dept: PERINATAL CARE | Facility: CLINIC | Age: 31
End: 2021-07-12

## 2021-07-12 VITALS
HEART RATE: 115 BPM | BODY MASS INDEX: 37.18 KG/M2 | WEIGHT: 251 LBS | DIASTOLIC BLOOD PRESSURE: 57 MMHG | SYSTOLIC BLOOD PRESSURE: 119 MMHG | HEIGHT: 69 IN

## 2021-07-12 VITALS
BODY MASS INDEX: 37.62 KG/M2 | DIASTOLIC BLOOD PRESSURE: 80 MMHG | HEART RATE: 106 BPM | WEIGHT: 254 LBS | SYSTOLIC BLOOD PRESSURE: 122 MMHG | HEIGHT: 69 IN

## 2021-07-12 DIAGNOSIS — O36.63X0 FETAL MACROSOMIA DURING PREGNANCY IN THIRD TRIMESTER, SINGLE OR UNSPECIFIED FETUS: Primary | ICD-10-CM

## 2021-07-12 DIAGNOSIS — Z3A.32 32 WEEKS GESTATION OF PREGNANCY: ICD-10-CM

## 2021-07-12 DIAGNOSIS — Z34.93 PRENATAL CARE IN THIRD TRIMESTER: Primary | ICD-10-CM

## 2021-07-12 DIAGNOSIS — O99.012 ANEMIA AFFECTING PREGNANCY IN SECOND TRIMESTER: ICD-10-CM

## 2021-07-12 DIAGNOSIS — Z36.89 ENCOUNTER FOR ULTRASOUND TO CHECK FETAL GROWTH: ICD-10-CM

## 2021-07-12 DIAGNOSIS — R82.71 GBS BACTERIURIA: ICD-10-CM

## 2021-07-12 DIAGNOSIS — Z36.2 ENCOUNTER FOR FOLLOW-UP ULTRASOUND OF FETAL ANATOMY: ICD-10-CM

## 2021-07-12 DIAGNOSIS — O99.210 OBESITY IN PREGNANCY: ICD-10-CM

## 2021-07-12 LAB
ERYTHROCYTE [DISTWIDTH] IN BLOOD BY AUTOMATED COUNT: 14 % (ref 11.6–15.1)
HCT VFR BLD AUTO: 31.3 % (ref 34.8–46.1)
HGB BLD-MCNC: 10.4 G/DL (ref 11.5–15.4)
MCH RBC QN AUTO: 28 PG (ref 26.8–34.3)
MCHC RBC AUTO-ENTMCNC: 33.2 G/DL (ref 31.4–37.4)
MCV RBC AUTO: 84 FL (ref 82–98)
PLATELET # BLD AUTO: 182 THOUSANDS/UL (ref 149–390)
PMV BLD AUTO: 10.6 FL (ref 8.9–12.7)
RBC # BLD AUTO: 3.72 MILLION/UL (ref 3.81–5.12)
SL AMB  POCT GLUCOSE, UA: 14
SL AMB LEUKOCYTE ESTERASE,UA: ABNORMAL
SL AMB POCT BILIRUBIN,UA: ABNORMAL
SL AMB POCT BLOOD,UA: ABNORMAL
SL AMB POCT CLARITY,UA: ABNORMAL
SL AMB POCT COLOR,UA: YELLOW
SL AMB POCT KETONES,UA: ABNORMAL
SL AMB POCT NITRITE,UA: ABNORMAL
SL AMB POCT PH,UA: 6
SL AMB POCT SPECIFIC GRAVITY,UA: 1.02
SL AMB POCT URINE PROTEIN: ABNORMAL
SL AMB POCT UROBILINOGEN: ABNORMAL
WBC # BLD AUTO: 8.66 THOUSAND/UL (ref 4.31–10.16)

## 2021-07-12 PROCEDURE — 76816 OB US FOLLOW-UP PER FETUS: CPT | Performed by: OBSTETRICS & GYNECOLOGY

## 2021-07-12 PROCEDURE — 81002 URINALYSIS NONAUTO W/O SCOPE: CPT | Performed by: OBSTETRICS & GYNECOLOGY

## 2021-07-12 PROCEDURE — 99213 OFFICE O/P EST LOW 20 MIN: CPT | Performed by: OBSTETRICS & GYNECOLOGY

## 2021-07-12 PROCEDURE — 85027 COMPLETE CBC AUTOMATED: CPT

## 2021-07-12 PROCEDURE — 36415 COLL VENOUS BLD VENIPUNCTURE: CPT

## 2021-07-12 NOTE — PROGRESS NOTES
[address]: Ms Osvaldo Sanabria was seen today at 32w5d for fetal growth and followup missed anatomy ultrasound  See ultrasound report under "OB Procedures" tab  Please don't hesitate to contact our office with any concerns or questions    Nettie Cee MD

## 2021-07-12 NOTE — PATIENT INSTRUCTIONS
Kick Counts in Pregnancy   WHAT YOU NEED TO KNOW:   What do I need to know about kick counts? Kick counts measure how much your baby is moving in your womb  A kick from your baby can be felt as a twist, turn, swish, roll, or jab  It is common to feel your baby kicking at 26 to 28 weeks of pregnancy  You may feel your baby kick as early as 20 weeks of pregnancy  You may want to start counting at 28 weeks  Why should I measure kick counts? Your baby's movement may provide information about your baby's health  He or she may move less, or not at all, if there are problems  Your baby may move less if he or she is not getting enough oxygen or nutrition from the placenta  Do not smoke while you are pregnant  Smoking decreases the amount of oxygen that gets to your baby  Talk to your healthcare provider if you need help to quit smoking  Tell your healthcare provider as soon as you feel a change in your baby's movements  When do I measure kick counts? · Measure kick counts at the same time every day  · Measure kick counts when your baby is awake and most active  Your baby may be most active in the evening  How do I measure kick counts? Check that your baby is awake before you measure kick counts  You can wake up your baby by lightly pushing on your belly, walking, or drinking something cold  Your healthcare provider may tell you different ways to measure kick counts  You may be told to do the following:  · Use a chart or clock to keep track of the time you start and finish counting  · Sit in a chair or lie on your left side  · Place your hands on the largest part of your belly  · Count until you reach 10 kicks  Write down how much time it takes to count 10 kicks  · It may take 30 minutes to 2 hours to count 10 kicks  It should not take more than 2 hours to count 10 kicks  When should I contact my healthcare provider? · You feel a change in the number of kicks or movements of your baby  · You feel fewer than 10 kicks within 2 hours  · You have questions or concerns about your baby's movements  CARE AGREEMENT:   You have the right to help plan your care  Learn about your health condition and how it may be treated  Discuss treatment options with your healthcare providers to decide what care you want to receive  You always have the right to refuse treatment  The above information is an  only  It is not intended as medical advice for individual conditions or treatments  Talk to your doctor, nurse or pharmacist before following any medical regimen to see if it is safe and effective for you  © Copyright [address] [place] [address] Drive Information is for End User's use only and may not be sold, redistributed or otherwise used for commercial purposes   All illustrations and images included in CareNotes® are the copyrighted property of A D A M , Inc  or [address] [place] [address]

## 2021-07-12 NOTE — PROGRESS NOTES
Tam Valencia presents today for routine OB visit at [address] [facility]  S:  She reports no complaints today  Denies uterine contractions  Denies vaginal bleeding or leaking of fluid  Reports adequate fetal movement of at least 10 movements in 2 hours once daily  O:  Blood Pressure: 122/80  Hc=453 kg (254 lb); Body mass index is 37 51 kg/m² ; TWG=9 526 kg (21 lb)  Fetal Heart Rate: 144; Fundal Height (cm): 33 cm    Pulm: Non-labored breathing  Abdomen: gravid, soft, non-tender  Extremities: non-tender, trace edema  Third trimester bloodwork notable for mild anemia  Urine dip:  +trace proteins, + trace ketones, +small blood  A/P:  Problem List Items Addressed This Visit        Other    32 weeks gestation of pregnancy     Recommended weight gain maximum 20 lb  Continue daily fetal kick counts  Has follow-up ultrasound today; follow-up results and recommendations   labor precautions reviewed  Return to office in 2 weeks         GBS bacteriuria     Needs intrapartum prophylaxis with penicillin         Obesity in pregnancy     Weight gain appropriate         Anemia affecting pregnancy     28 week hemoglobin 9 9 grams/deciliter  Reports compliance with oral iron therapy  Will repeat CBC at this time to assess effectiveness of oral iron         Relevant Orders    CBC and Platelet (Completed)    Prenatal care in third trimester - Primary    Relevant Orders    POCT urine dip (Completed)        Patient discussed with Dr Aleta Morgan MD  PGY II, OB/GYN  2021, 7:13 AM

## 2021-07-13 NOTE — ASSESSMENT & PLAN NOTE
28 week hemoglobin 9 9 grams/deciliter  Reports compliance with oral iron therapy  Will repeat CBC at this time to assess effectiveness of oral iron

## 2021-07-13 NOTE — ASSESSMENT & PLAN NOTE
Recommended weight gain maximum 20 lb  Continue daily fetal kick counts  Has follow-up ultrasound today; follow-up results and recommendations   labor precautions reviewed  Return to office in 2 weeks

## 2021-07-14 ENCOUNTER — TELEPHONE (OUTPATIENT)
Dept: OTHER | Facility: HOSPITAL | Age: 31
End: 2021-07-14

## 2021-07-14 NOTE — TELEPHONE ENCOUNTER
Hemoglobin now 10 4  Continue twice daily oral iron therapy  FU prenatal appointment scheduled with me for 7/26  Daiana Crain MD  PGY II, OB/GYN  7/14/2021, 3:36 PM

## 2021-07-20 PROBLEM — Z3A.33 33 WEEKS GESTATION OF PREGNANCY: Status: ACTIVE | Noted: 2021-04-09

## 2021-07-25 PROBLEM — Z3A.34 34 WEEKS GESTATION OF PREGNANCY: Status: ACTIVE | Noted: 2021-04-09

## 2021-07-26 ENCOUNTER — ROUTINE PRENATAL (OUTPATIENT)
Dept: OBGYN CLINIC | Facility: CLINIC | Age: 31
End: 2021-07-26

## 2021-07-26 VITALS
HEART RATE: 92 BPM | SYSTOLIC BLOOD PRESSURE: 120 MMHG | DIASTOLIC BLOOD PRESSURE: 76 MMHG | HEIGHT: 69 IN | WEIGHT: 253 LBS | BODY MASS INDEX: 37.47 KG/M2

## 2021-07-26 DIAGNOSIS — Z34.93 PRENATAL CARE IN THIRD TRIMESTER: Primary | ICD-10-CM

## 2021-07-26 DIAGNOSIS — O99.210 OBESITY IN PREGNANCY: ICD-10-CM

## 2021-07-26 DIAGNOSIS — Z3A.34 34 WEEKS GESTATION OF PREGNANCY: ICD-10-CM

## 2021-07-26 LAB
SL AMB  POCT GLUCOSE, UA: NORMAL
SL AMB POCT URINE PROTEIN: NORMAL

## 2021-07-26 PROCEDURE — 99213 OFFICE O/P EST LOW 20 MIN: CPT | Performed by: OBSTETRICS & GYNECOLOGY

## 2021-07-26 PROCEDURE — 81002 URINALYSIS NONAUTO W/O SCOPE: CPT | Performed by: OBSTETRICS & GYNECOLOGY

## 2021-07-26 NOTE — PATIENT INSTRUCTIONS
Conteo de patadas en el embarazo   CUIDADO AMBULATORIO:   El conteo de patadas mide cuánto se está moviendo mireles bebé en el útero  Sofia patada de mireles bebé podría sentirse tri sofia torcedura, sofia vuelta, un crujido, un meneo o un golpe  Es común sentir a tu bebé patear a las 32 a 29 semanas de [place]  Es posible que sienta al bebé patear ya a las 20 semanas de [place]  Puede que desee empezar a contar a las 28 semanas  Comuníquese inmediatamente con mireles médico si:  · Usted siente un cambio en el número de patadas o movimientos de mireles bebé  · Siente menos de 10 patadas en 2 horas  · Usted tiene preguntas o inquietudes acerca de los movimientos de mireles bebé  Por qué realizar el conteo de patadas: Los movimientos de mireles bebé podrían proporcionar información de la ericka de mireles bebé  Es posible que si hay problemas, mireles bebé se mueva menos o nada en lo absoluto  El bebé podría moverse menos si no recibe suficiente oxígeno o alimento de la placenta  No fume mientras está embarazada  Fumar disminuye la cantidad de oxígeno que llega a mireles bebé  Hable con mireles médico si necesita ayuda para dejar de fumar  Los problemas que se encuentran en sofia etapa más temprana son más fáciles de tratar  Cuándo realizar el conteo de patadas:  · Cuente las patadas en el mismo horario todos los GRASSE  · Realice el conteo de las patadas cuando mireles bebé esté despierto y [place]  Mireles bebé podría estar más activo en la tarde  Cómo realizar el conteo de patadas: Revise que mireles bebé esté despierto antes de realizar el conteo de patadas  Usted puede despertar a mireles bebé empujando mireles estómago suavemente, caminando o tomando algo frío  Mireles médico podría indicarle diferentes maneras de realizar el conteo  Es posible que le indique que martha lo siguiente:  · Use sofia gráfica o un reloj para mantener un registro de la hora en que comienza y termina de contar  · Siéntese en sofia silla o acuéstese en mireles costado derecho      · Coloque lowell sanna en la parte más nuvia de mireles estómago  · Cuente hasta que llegue a las 10 patadas  Escriba cuánto tiempo le lleva contar las 10 patadas  · Podría cortney de 30 minutos a 2 horas para contar 10 patadas  No debería de cortney más de 2 horas para contar 10 patadas  Acuda a lowell consultas de control con mireles médico según le indicaron  Anote lowell preguntas para que se acuerde de hacerlas medhat lowell visitas  © Copyright [employer] 2021 Information is for End User's use only and may not be sold, redistributed or otherwise used for commercial purposes  All illustrations and images included in CareNotes® are the copyrighted property of A D A M , Inc  or [address] [place] [address] es sólo para uso en educación  Mireles intención no es darle un consejo médico sobre enfermedades o tratamientos  Colsulte con mireles [place] farmacéutico antes de seguir cualquier régimen médico para saber si es seguro y efectivo para usted

## 2021-07-26 NOTE — LETTER
July 26, 2021     Patient: Neeru Jerez   YOB: 1990   Date of Visit: 7/26/2021       To Whom it May Concern:    Neeru Jerez is under my professional care  She is currently 345 and 5 days pregnant and is otherwise healthy  She can proceed with the root canal surgery  If you have any questions or concerns, please don't hesitate to call           Sincerely,          Jose G Pedro MD

## 2021-07-26 NOTE — PROGRESS NOTES
Robinson Mcmullen presents today for routine OB visit at 34w5d  S:  She reports no complaints  Denies uterine contractions  Denies vaginal bleeding or leaking of fluid  Reports adequate fetal movement of at least 10 movements in 2 hours once daily  O:  Blood Pressure: 120/76  Pe=888 kg (253 lb); Body mass index is 37 36 kg/m² ; TWG=10 4 kg (23 lb)  Fetal Heart Rate: 143; Fundal Height (cm): 35 cm    Pulm: Non-labored breathing  Abdomen: gravid, soft, non-tender  Extremities: non-tender, no edema  Recent Results (from the past 24 hour(s))   POCT urine dip    Collection Time: 21  9:57 AM   Result Value Ref Range    POCT URINE PROTEIN neg     GLUCOSE, UA neg        A/P:  pregnancy Problems (from 21 to present)     Problem Noted Resolved    Anemia affecting pregnancy 2021 by Caron Bansal MD No    34 weeks gestation of pregnancy 2021 by Sri Isaac MD No    GBS bacteriuria 2021 by Sri Isaac MD No    Obesity in pregnancy 2021 by Sri Isaac MD No    Encounter for supervision of normal pregnancy in second trimester 2021 by Sri Isaac MD 2021 by Aster Miller MD      Fetal macrosmia: FU ultrasound for growth on   Daily kick counts   labor precautions reviewed  RTO in 2 weeks then weekly therafter  Patient discussed with Dr Dov Lawrence MD  PGY II, OB/GYN  2021, 10:46 AM

## 2021-08-09 ENCOUNTER — ROUTINE PRENATAL (OUTPATIENT)
Dept: OBGYN CLINIC | Facility: CLINIC | Age: 31
End: 2021-08-09

## 2021-08-09 VITALS
SYSTOLIC BLOOD PRESSURE: 121 MMHG | HEIGHT: 69 IN | BODY MASS INDEX: 38.21 KG/M2 | HEART RATE: 90 BPM | WEIGHT: 258 LBS | DIASTOLIC BLOOD PRESSURE: 71 MMHG

## 2021-08-09 DIAGNOSIS — R82.71 GBS BACTERIURIA: ICD-10-CM

## 2021-08-09 DIAGNOSIS — Z3A.36 36 WEEKS GESTATION OF PREGNANCY: Primary | ICD-10-CM

## 2021-08-09 DIAGNOSIS — O36.63X0 FETAL MACROSOMIA DURING PREGNANCY IN THIRD TRIMESTER, SINGLE OR UNSPECIFIED FETUS: ICD-10-CM

## 2021-08-09 PROCEDURE — 99213 OFFICE O/P EST LOW 20 MIN: CPT | Performed by: OBSTETRICS & GYNECOLOGY

## 2021-08-09 NOTE — ASSESSMENT & PLAN NOTE
F/u growth ultrasound scheduled for 8/16  Pelvis proven to over 9lbs  Patient feels that this baby is slightly bigger then her last baby

## 2021-08-09 NOTE — PROGRESS NOTES
OB/GYN  PN Visit  Shirley Peres  [id number]  8/9/2021  10:06 AM  Dr Peri Ignacio MD    S: 27 y o  [id number] 36w5d here for PN visit  OB complaints:  Ctxns: no  Leakage: no  Bleeding: no  Fetal movement: yes    She reports that she is feeling well   She has no complaints at this time    O:  Vitals:    08/09/21 1004   BP: 121/71   Pulse: 90       Gen: no acute distress, nonlabored breathing  Fundal height: 38cm  FHT: 170bpm; NST: 150bpm baseline, moderate variability, accelerations, no decelerations       Presentation: Vertex    A/P:    Peri Ignacio MD  8/9/2021  10:06 AM

## 2021-08-16 ENCOUNTER — ULTRASOUND (OUTPATIENT)
Dept: PERINATAL CARE | Facility: CLINIC | Age: 31
End: 2021-08-16
Payer: COMMERCIAL

## 2021-08-16 ENCOUNTER — ROUTINE PRENATAL (OUTPATIENT)
Dept: OBGYN CLINIC | Facility: CLINIC | Age: 31
End: 2021-08-16

## 2021-08-16 VITALS
WEIGHT: 240 LBS | BODY MASS INDEX: 35.55 KG/M2 | DIASTOLIC BLOOD PRESSURE: 81 MMHG | SYSTOLIC BLOOD PRESSURE: 134 MMHG | HEART RATE: 96 BPM | HEIGHT: 69 IN

## 2021-08-16 VITALS
WEIGHT: 261 LBS | BODY MASS INDEX: 38.66 KG/M2 | HEART RATE: 101 BPM | DIASTOLIC BLOOD PRESSURE: 69 MMHG | HEIGHT: 69 IN | SYSTOLIC BLOOD PRESSURE: 121 MMHG

## 2021-08-16 DIAGNOSIS — O36.63X0 FETAL MACROSOMIA DURING PREGNANCY IN THIRD TRIMESTER, SINGLE OR UNSPECIFIED FETUS: Primary | ICD-10-CM

## 2021-08-16 DIAGNOSIS — Z3A.37 37 WEEKS GESTATION OF PREGNANCY: ICD-10-CM

## 2021-08-16 DIAGNOSIS — Z34.93 PRENATAL CARE IN THIRD TRIMESTER: ICD-10-CM

## 2021-08-16 DIAGNOSIS — O99.210 OBESITY IN PREGNANCY: ICD-10-CM

## 2021-08-16 PROCEDURE — 76816 OB US FOLLOW-UP PER FETUS: CPT | Performed by: OBSTETRICS & GYNECOLOGY

## 2021-08-16 PROCEDURE — 99213 OFFICE O/P EST LOW 20 MIN: CPT | Performed by: OBSTETRICS & GYNECOLOGY

## 2021-08-16 RX ORDER — FERROUS SULFATE 325(65) MG
325 TABLET ORAL
COMMUNITY

## 2021-08-16 NOTE — PROGRESS NOTES
OB/GYN  PN Visit  Quinton Hernandez  [id number]  8/16/2021  11:48 AM  Dr Tee Mendiola MD    S: 27 y o  [id number] 37w5d here for PN visit  OB complaints:  Ctxns: no  Leakage: no  Bleeding: no   Fetal movement: yes     She reports that she is feeling well   She has no complaints at this time    O:  Vitals:    08/16/21 1133   BP: 134/81   Pulse: 96       Gen: no acute distress, nonlabored breathing  Fundal height: 39cm  FHT: 152bpm       A/P:    Problem List Items Addressed This Visit        Other    37 weeks gestation of pregnancy     Labor precautions give  Plan for Mirena IUD at 6 weeks postpartum  GBS positive, will need PCN in labor  RTC in 1 week         Prenatal care in third trimester    Fetal macrosomia in pregnancy in third trimester - Primary     Repeat growth scan scheduled for later today  [facility] Chhaya  8/16/2021  11:48 AM

## 2021-08-16 NOTE — PROGRESS NOTES
Javid Staff has no complaints today  She reports regular fetal movements and does not report any problems  She is here today at 37w5d for an ultrasound for  Fetal growth  She had 2 prior pregnancies that delivered vaginally that weighed 8 pounds 2 ounces and 9 pounds 5 ounces  She reports a 30 minute 2nd stage with her 2nd pregnancy that weighed 9 pounds 5 ounces and that was with a different father than this pregnancy  Her partner was with her today and he reports that both he and his brother and a sister all weighed at 9 pounds 13 ounces or greater at birth  His mother required a  with all of her pregnancies  She completed a normal 3 hour glucose tolerance test   Her weight gain in this pregnancy was 31 pounds  Ultrasound findings: The ultrasound today shows a macrosomic baby  The fetal abdomen measures 38 2 centimeters and the fetal head measures 35 0 centimeters  The LISA appears normal  Overall the estimated fetal weight is 4233 grams or 9 pounds 5 ounces at today's visit  Pregnancy ultrasound has limitations and is unable to detect all forms of fetal congenital abnormalities  The inaccuracy in the EFW can be off by 1 lb either way in the third trimester  Specific counseling was provided on the following problems:  1  A macrosomic assymetric fetus is noted on todays scan  Reviewed inaccuracy of EFW in the third trimester which can be off by 1 lb either way  There is mild asymmetry with the fetal abdomen larger then the fetal head circumference  Most of these can be resolved by the different maneuvers that her provider is trained to do  There are some cases though that will be unable to be resolved without some danger to the fetus such as hypoxemia, neurologic damage, fetal death or  nerve injury to an arm ect   ACOG recommends a  section in a mother who has no evidence of diabetes and her fetus has an estimated fetal weight of 5000 gms or more which is more then this babies estimate today  Patient and FOB give options for delivery: Follow up recommended:   1  No further follow-up ultrasounds are recommended  2  Could offer planned cautious induction around 39 weeks  Would not recommend an operative vaginal delivery  3  Elective CS after 39 weeks is also an option  Pre visit time reviewing her records   5 minutes  Face to face time 10 minutes  Post visit time on documentation of note, updating her problem list, adding orders and prescriptions 10 minutes  Procedures that were completed today were charged separately  The level of decision making was moderate complexity      Sanjuana Hensley MD

## 2021-08-16 NOTE — ASSESSMENT & PLAN NOTE
Labor precautions give  Plan for Mirena IUD at 6 weeks postpartum  GBS positive, will need PCN in labor  RTC in 1 week

## 2021-08-16 NOTE — LETTER
2021     Carolina Ward, [address] [address]    Patient: Dieudonne Velazquez   YOB: 1990   Date of Visit: 2021       Dear Dr Trav Killian: Thank you for referring Dieudonne Velazquez to me for evaluation  Below are my notes for this consultation  If you have questions, please do not hesitate to call me  I look forward to following your patient along with you  Sincerely,        Larisa Ventura MD        CC: MD Shaheen Trimble CRNP Alice Husky, MD  2021 11:29 AM  Sign when Signing Visit  Dieudonne Velazquez has no complaints today  She reports regular fetal movements and does not report any problems  She is here today at 37w5d for an ultrasound for  Fetal growth  She had 2 prior pregnancies that delivered vaginally that weighed 8 pounds 2 ounces and 9 pounds 5 ounces  She reports a 30 minute 2nd stage with her 2nd pregnancy that weighed 9 pounds 5 ounces and that was with a different father than this pregnancy  Her partner was with her today and he reports that both he and his brother and a sister all weighed at 9 pounds 13 ounces or greater at birth  His mother required a  with all of her pregnancies  She completed a normal 3 hour glucose tolerance test   Her weight gain in this pregnancy was 31 pounds  Ultrasound findings: The ultrasound today shows a macrosomic baby  The fetal abdomen measures 38 2 centimeters and the fetal head measures 35 0 centimeters  The LISA appears normal  Overall the estimated fetal weight is 4233 grams or 9 pounds 5 ounces at today's visit  Pregnancy ultrasound has limitations and is unable to detect all forms of fetal congenital abnormalities  The inaccuracy in the EFW can be off by 1 lb either way in the third trimester  Specific counseling was provided on the following problems:  1  A macrosomic assymetric fetus is noted on todays scan   Reviewed inaccuracy of EFW in the third trimester which can be off by 1 lb either way  There is mild asymmetry with the fetal abdomen larger then the fetal head circumference  Most of these can be resolved by the different maneuvers that her provider is trained to do  There are some cases though that will be unable to be resolved without some danger to the fetus such as hypoxemia, neurologic damage, fetal death or  nerve injury to an arm ect  ACOG recommends a  section in a mother who has no evidence of diabetes and her fetus has an estimated fetal weight of 5000 gms or more which is more then this babies estimate today  Patient and FOB give options for delivery: Follow up recommended:   1  No further follow-up ultrasounds are recommended  2  Could offer planned cautious induction around 39 weeks  Would not recommend an operative vaginal delivery  3  Elective CS after 39 weeks is also an option  Pre visit time reviewing her records   5 minutes  Face to face time 10 minutes  Post visit time on documentation of note, updating her problem list, adding orders and prescriptions 10 minutes  Procedures that were completed today were charged separately  The level of decision making was moderate complexity      Kathrine Rodriguez MD

## 2021-08-23 ENCOUNTER — ROUTINE PRENATAL (OUTPATIENT)
Dept: OBGYN CLINIC | Facility: CLINIC | Age: 31
End: 2021-08-23

## 2021-08-23 VITALS
HEIGHT: 69 IN | BODY MASS INDEX: 38.36 KG/M2 | HEART RATE: 95 BPM | DIASTOLIC BLOOD PRESSURE: 68 MMHG | WEIGHT: 259 LBS | SYSTOLIC BLOOD PRESSURE: 130 MMHG

## 2021-08-23 DIAGNOSIS — Z34.93 PRENATAL CARE IN THIRD TRIMESTER: Primary | ICD-10-CM

## 2021-08-23 DIAGNOSIS — R82.71 GBS BACTERIURIA: ICD-10-CM

## 2021-08-23 DIAGNOSIS — O99.013 ANEMIA AFFECTING PREGNANCY IN THIRD TRIMESTER: ICD-10-CM

## 2021-08-23 DIAGNOSIS — O99.210 OBESITY IN PREGNANCY: ICD-10-CM

## 2021-08-23 DIAGNOSIS — O36.63X0 FETAL MACROSOMIA DURING PREGNANCY IN THIRD TRIMESTER, SINGLE OR UNSPECIFIED FETUS: ICD-10-CM

## 2021-08-23 DIAGNOSIS — Z3A.38 38 WEEKS GESTATION OF PREGNANCY: ICD-10-CM

## 2021-08-23 PROCEDURE — 99213 OFFICE O/P EST LOW 20 MIN: CPT | Performed by: NURSE PRACTITIONER

## 2021-08-23 NOTE — PROGRESS NOTES
Gustavo Pope presents today for routine OB visit at 38w5d  Blood Pressure: 130/68  Bc=841 kg (259 lb); Body mass index is 38 25 kg/m² ; TWG=13 2 kg (29 lb)  Fetal Heart Rate: 148; Fundal Height (cm): 40 cm  SVE today: Cervical Dilation: 1 /Cervical Effacement: 20 /Fetal Station: -3  Abdomen: gravid, soft, non-tender  She reports no compmplaints  Denies uterine contractions  Denies vaginal bleeding or leaking of fluid  Reports adequate fetal movement of at least 10 movements in 2 hours once daily  Reviewed labor precautions and fetal kick counts as well as pre-eclampsia warning signs  Reviewed perineal massage for decreasing risk of perineal lacerations during delivery  Discussed with patient ultrasound findings from 21 and [facility] recommendations  She would like to proceed with 39 week induction due to fetal macrosomia  Induction consent form signed with patient  Induction of labor is scheduled 21 at 8pm for cervical ripening  Current Outpatient Medications   Medication Instructions    ferrous sulfate 325 mg, Oral, Daily with breakfast    Prenatal Vit-Fe Fumarate-FA (PRENATAL VITAMIN PO) 1 tablet, Oral         pregnancy Problems (from 21 to present)     Problem Noted Resolved    Anemia affecting pregnancy 2021 by Donny Amezcua MD No    38 weeks gestation of pregnancy 2021 by Venessa Sommer MD No    Overview Addendum 2021 11:59 AM by HELEN Dodge     -Last ultrasound 21: estimated fetal weight is 4233 grams or 9 pounds 5 ounces  [facility] recommendation: could offer planned cautious induction around 39 weeks  Would not recommend an operative vaginal delivery   Elective CS after 39 weeks is also an option   -2 prior successful  8 pounds 2 ounces and 9 pounds 5 ounces  -Pre-gravid BMI 33   -Elevated 1 hour GTT at 185, passed 3 hour GTT  -Tdap 21           Previous Version    GBS bacteriuria 2021 by Venessa Sommer MD No    Overview Signed 2021  7:33 PM by Jake Lopez MD     Needs intrapartum prophylaxis          Obesity in pregnancy 4/9/2021 by Venessa Sommer MD No    Encounter for supervision of normal pregnancy in third trimester 4/9/2021 by Venessa Sommer MD 6/29/2021 by Gregory Ramirez MD

## 2021-08-27 ENCOUNTER — HOSPITAL ENCOUNTER (INPATIENT)
Dept: LABOR AND DELIVERY | Facility: HOSPITAL | Age: 31
Discharge: HOME | DRG: 560 | End: 2021-08-27
Payer: COMMERCIAL

## 2021-08-27 ENCOUNTER — HOSPITAL ENCOUNTER (INPATIENT)
Facility: HOSPITAL | Age: 31
LOS: 3 days | Discharge: HOME | DRG: 560 | End: 2021-08-30
Attending: OBSTETRICS & GYNECOLOGY | Admitting: OBSTETRICS & GYNECOLOGY
Payer: COMMERCIAL

## 2021-08-27 DIAGNOSIS — Z3A.39 39 WEEKS GESTATION OF PREGNANCY: Primary | ICD-10-CM

## 2021-08-27 LAB
ABO GROUP BLD: NORMAL
BASOPHILS # BLD AUTO: 0.03 THOUSANDS/ΜL (ref 0–0.1)
BASOPHILS NFR BLD AUTO: 0 % (ref 0–1)
BLD GP AB SCN SERPL QL: NEGATIVE
EOSINOPHIL # BLD AUTO: 0.05 THOUSAND/ΜL (ref 0–0.61)
EOSINOPHIL NFR BLD AUTO: 1 % (ref 0–6)
ERYTHROCYTE [DISTWIDTH] IN BLOOD BY AUTOMATED COUNT: 13.7 % (ref 11.6–15.1)
HCT VFR BLD AUTO: 33.1 % (ref 34.8–46.1)
HGB BLD-MCNC: 11 G/DL (ref 11.5–15.4)
IMM GRANULOCYTES # BLD AUTO: 0.13 THOUSAND/UL (ref 0–0.2)
IMM GRANULOCYTES NFR BLD AUTO: 2 % (ref 0–2)
LYMPHOCYTES # BLD AUTO: 1.86 THOUSANDS/ΜL (ref 0.6–4.47)
LYMPHOCYTES NFR BLD AUTO: 21 % (ref 14–44)
MCH RBC QN AUTO: 27.8 PG (ref 26.8–34.3)
MCHC RBC AUTO-ENTMCNC: 33.2 G/DL (ref 31.4–37.4)
MCV RBC AUTO: 84 FL (ref 82–98)
MONOCYTES # BLD AUTO: 0.75 THOUSAND/ΜL (ref 0.17–1.22)
MONOCYTES NFR BLD AUTO: 9 % (ref 4–12)
NEUTROPHILS # BLD AUTO: 5.94 THOUSANDS/ΜL (ref 1.85–7.62)
NEUTS SEG NFR BLD AUTO: 67 % (ref 43–75)
NRBC BLD AUTO-RTO: 0 /100 WBCS
PLATELET # BLD AUTO: 179 THOUSANDS/UL (ref 149–390)
PMV BLD AUTO: 10.8 FL (ref 8.9–12.7)
RBC # BLD AUTO: 3.96 MILLION/UL (ref 3.81–5.12)
RH BLD: POSITIVE
SPECIMEN EXPIRATION DATE: NORMAL
WBC # BLD AUTO: 8.76 THOUSAND/UL (ref 4.31–10.16)

## 2021-08-27 PROCEDURE — 86900 BLOOD TYPING SEROLOGIC ABO: CPT

## 2021-08-27 PROCEDURE — 86592 SYPHILIS TEST NON-TREP QUAL: CPT

## 2021-08-27 PROCEDURE — 85025 COMPLETE CBC W/AUTO DIFF WBC: CPT

## 2021-08-27 PROCEDURE — 4A1HXCZ MONITORING OF PRODUCTS OF CONCEPTION, CARDIAC RATE, EXTERNAL APPROACH: ICD-10-PCS | Performed by: OBSTETRICS & GYNECOLOGY

## 2021-08-27 PROCEDURE — 86901 BLOOD TYPING SEROLOGIC RH(D): CPT

## 2021-08-27 PROCEDURE — 86850 RBC ANTIBODY SCREEN: CPT

## 2021-08-27 PROCEDURE — 3E033VJ INTRODUCTION OF OTHER HORMONE INTO PERIPHERAL VEIN, PERCUTANEOUS APPROACH: ICD-10-PCS | Performed by: OBSTETRICS & GYNECOLOGY

## 2021-08-27 PROCEDURE — NC001 PR NO CHARGE: Performed by: OBSTETRICS & GYNECOLOGY

## 2021-08-27 RX ORDER — SODIUM CHLORIDE, SODIUM LACTATE, POTASSIUM CHLORIDE, CALCIUM CHLORIDE 600; 310; 30; 20 MG/100ML; MG/100ML; MG/100ML; MG/100ML
125 INJECTION, SOLUTION INTRAVENOUS CONTINUOUS
Status: DISCONTINUED | OUTPATIENT
Start: 2021-08-27 | End: 2021-08-31 | Stop reason: HOSPADM

## 2021-08-27 RX ORDER — OXYTOCIN/RINGER'S LACTATE 30/500 ML
1-30 PLASTIC BAG, INJECTION (ML) INTRAVENOUS
Status: DISCONTINUED | OUTPATIENT
Start: 2021-08-27 | End: 2021-08-31 | Stop reason: HOSPADM

## 2021-08-27 RX ORDER — ONDANSETRON 2 MG/ML
4 INJECTION INTRAMUSCULAR; INTRAVENOUS EVERY 8 HOURS PRN
Status: DISCONTINUED | OUTPATIENT
Start: 2021-08-27 | End: 2021-08-28

## 2021-08-27 RX ORDER — CALCIUM CARBONATE 200(500)MG
1000 TABLET,CHEWABLE ORAL DAILY PRN
Status: DISCONTINUED | OUTPATIENT
Start: 2021-08-27 | End: 2021-08-28

## 2021-08-27 RX ORDER — ACETAMINOPHEN 325 MG/1
650 TABLET ORAL EVERY 4 HOURS PRN
Status: DISCONTINUED | OUTPATIENT
Start: 2021-08-27 | End: 2021-08-28

## 2021-08-27 RX ADMIN — SODIUM CHLORIDE 5 MILLION UNITS: 0.9 INJECTION, SOLUTION INTRAVENOUS at 21:16

## 2021-08-27 RX ADMIN — Medication 2 MILLI-UNITS/MIN: at 21:53

## 2021-08-27 RX ADMIN — SODIUM CHLORIDE, SODIUM LACTATE, POTASSIUM CHLORIDE, AND CALCIUM CHLORIDE 125 ML/HR: .6; .31; .03; .02 INJECTION, SOLUTION INTRAVENOUS at 21:13

## 2021-08-28 LAB
BASE EXCESS BLDCOA CALC-SCNC: -0.5 MMOL/L (ref 3–11)
BASE EXCESS BLDCOV CALC-SCNC: -0.4 MMOL/L (ref 1–9)
HCO3 BLDCOA-SCNC: 22.6 MMOL/L (ref 17.3–27.3)
HCO3 BLDCOV-SCNC: 22.5 MMOL/L (ref 12.2–28.6)
O2 CT VFR BLDCOA CALC: 17.9 ML/DL
OXYHGB MFR BLDCOA: 74.5 %
OXYHGB MFR BLDCOV: 73.3 %
PCO2 BLDCOA: 33.6 MM[HG] (ref 30–60)
PCO2 BLDCOV: 32.9 MM HG (ref 27–43)
PH BLDCOA: 7.45 [PH] (ref 7.23–7.43)
PH BLDCOV: 7.45 [PH] (ref 7.19–7.49)
PO2 BLDCOA: 30.7 MM HG (ref 5–25)
PO2 BLDCOV: 29.7 MM HG (ref 15–45)
SAO2 % BLDCOV: 17.7 ML/DL

## 2021-08-28 PROCEDURE — 59409 OBSTETRICAL CARE: CPT | Performed by: OBSTETRICS & GYNECOLOGY

## 2021-08-28 PROCEDURE — 82805 BLOOD GASES W/O2 SATURATION: CPT | Performed by: OBSTETRICS & GYNECOLOGY

## 2021-08-28 PROCEDURE — NC001 PR NO CHARGE: Performed by: OBSTETRICS & GYNECOLOGY

## 2021-08-28 RX ORDER — ACETAMINOPHEN 325 MG/1
650 TABLET ORAL EVERY 6 HOURS PRN
Status: DISCONTINUED | OUTPATIENT
Start: 2021-08-28 | End: 2021-08-31 | Stop reason: HOSPADM

## 2021-08-28 RX ORDER — IBUPROFEN 600 MG/1
600 TABLET ORAL EVERY 6 HOURS PRN
Status: DISCONTINUED | OUTPATIENT
Start: 2021-08-28 | End: 2021-08-31 | Stop reason: HOSPADM

## 2021-08-28 RX ORDER — DIPHENHYDRAMINE HYDROCHLORIDE 50 MG/ML
25 INJECTION INTRAMUSCULAR; INTRAVENOUS EVERY 6 HOURS PRN
Status: DISCONTINUED | OUTPATIENT
Start: 2021-08-28 | End: 2021-08-31 | Stop reason: HOSPADM

## 2021-08-28 RX ORDER — DIAPER,BRIEF,INFANT-TODD,DISP
1 EACH MISCELLANEOUS 4 TIMES DAILY PRN
Status: DISCONTINUED | OUTPATIENT
Start: 2021-08-28 | End: 2021-08-31 | Stop reason: HOSPADM

## 2021-08-28 RX ORDER — CALCIUM CARBONATE 200(500)MG
1000 TABLET,CHEWABLE ORAL DAILY PRN
Status: DISCONTINUED | OUTPATIENT
Start: 2021-08-28 | End: 2021-08-31 | Stop reason: HOSPADM

## 2021-08-28 RX ORDER — ONDANSETRON 2 MG/ML
4 INJECTION INTRAMUSCULAR; INTRAVENOUS EVERY 8 HOURS PRN
Status: DISCONTINUED | OUTPATIENT
Start: 2021-08-28 | End: 2021-08-31 | Stop reason: HOSPADM

## 2021-08-28 RX ORDER — DOCUSATE SODIUM 100 MG/1
100 CAPSULE, LIQUID FILLED ORAL 2 TIMES DAILY
Status: DISCONTINUED | OUTPATIENT
Start: 2021-08-28 | End: 2021-08-31 | Stop reason: HOSPADM

## 2021-08-28 RX ORDER — LIDOCAINE HYDROCHLORIDE 10 MG/ML
INJECTION, SOLUTION EPIDURAL; INFILTRATION; INTRACAUDAL; PERINEURAL
Status: DISPENSED
Start: 2021-08-28 | End: 2021-08-28

## 2021-08-28 RX ADMIN — IBUPROFEN 600 MG: 600 TABLET ORAL at 20:34

## 2021-08-28 RX ADMIN — DOCUSATE SODIUM 100 MG: 100 CAPSULE, LIQUID FILLED ORAL at 18:50

## 2021-08-28 RX ADMIN — Medication 2.5 MILLION UNITS: at 01:10

## 2021-08-28 RX ADMIN — IBUPROFEN 600 MG: 600 TABLET ORAL at 12:13

## 2021-08-28 RX ADMIN — BENZOCAINE AND LEVOMENTHOL 1 APPLICATION: 200; 5 SPRAY TOPICAL at 03:30

## 2021-08-28 NOTE — PLAN OF CARE
Problem: POSTPARTUM  Goal: Experiences normal postpartum course  Description: INTERVENTIONS:  - Monitor maternal vital signs  - Assess uterine involution and lochia  Outcome: Progressing  Goal: Appropriate maternal -  bonding  Description: INTERVENTIONS:  - Identify family support  - Assess for appropriate maternal/infant bonding   -Encourage maternal/infant bonding opportunities  - Referral to  or  as needed  Outcome: Progressing  Goal: Establishment of infant feeding pattern  Description: INTERVENTIONS:  - Assess breast/bottle feeding  - Refer to lactation as needed  Outcome: Progressing     Problem: PAIN - ADULT  Goal: Verbalizes/displays adequate comfort level or baseline comfort level  Description: Interventions:  - Encourage patient to monitor pain and request assistance  - Assess pain using appropriate pain scale  - Administer analgesics based on type and severity of pain and evaluate response  - Implement non-pharmacological measures as appropriate and evaluate response  - Consider cultural and social influences on pain and pain management  - Notify physician/advanced practitioner if interventions unsuccessful or patient reports new pain  2021 0245 by Angel Linn RN  Outcome: Progressing  20213 by Angel Linn RN  Outcome: Progressing     Problem: INFECTION - ADULT  Goal: Absence or prevention of progression during hospitalization  Description: INTERVENTIONS:  - Assess and monitor for signs and symptoms of infection  - Monitor lab/diagnostic results  - Monitor all insertion sites, i e  indwelling lines, tubes, and drains  - Monitor endotracheal if appropriate and nasal secretions for changes in amount and color  - [place] appropriate cooling/warming therapies per order  - Administer medications as ordered  - Instruct and encourage patient and family to use good hand hygiene technique  - Identify and instruct in appropriate isolation precautions for identified infection/condition  8/28/2021 0245 by Ophelia Hathaway RN  Outcome: Progressing  8/27/2021 2133 by Ophelia Hathaway RN  Outcome: Progressing     Problem: SAFETY ADULT  Goal: Patient will remain free of falls  Description: INTERVENTIONS:  - Educate patient/family on patient safety including physical limitations  - Instruct patient to call for assistance with activity   - Consult OT/PT to assist with strengthening/mobility   - Keep Call bell within reach  - Keep bed low and locked with side rails adjusted as appropriate  - Keep care items and personal belongings within reach  - Initiate and maintain comfort rounds  - Make Fall Risk Sign visible to staff  - Apply yellow socks and bracelet for high fall risk patients  - Consider moving patient to room near nurses station  8/28/2021 0245 by Ophelia Hathaway RN  Outcome: Progressing  8/27/2021 2133 by Ophelia Hathaway RN  Outcome: Progressing  Goal: Maintain or return to baseline ADL function  Description: INTERVENTIONS:  -  Assess patient's ability to carry out ADLs; assess patient's baseline for ADL function and identify physical deficits which impact ability to perform ADLs (bathing, care of mouth/teeth, toileting, grooming, dressing, etc )  - Assess/evaluate cause of self-care deficits   - Assess range of motion  - Assess patient's mobility; develop plan if impaired  - Assess patient's need for assistive devices and provide as appropriate  - Encourage maximum independence but intervene and supervise when necessary  - Involve family in performance of ADLs  - Assess for home care needs following discharge   - Consider OT consult to assist with ADL evaluation and planning for discharge  - Provide patient education as appropriate  8/28/2021 0245 by Ophelia Hathaway RN  Outcome: Progressing  8/27/2021 2133 by Ophelia Hathaway RN  Outcome: Progressing  Goal: Maintains/Returns to pre admission functional level  Description: INTERVENTIONS:  - Perform BMAT or MOVE assessment daily    - Set and communicate daily mobility goal to care team and patient/family/caregiver  - Collaborate with rehabilitation services on mobility goals if consulted  - Out of bed for toileting  - Record patient progress and toleration of activity level   8/28/2021 0245 by Alma Sheppard RN  Outcome: Progressing  8/27/2021 2133 by Alma Sheppard RN  Outcome: Progressing     Problem: Knowledge Deficit  Goal: Patient/family/caregiver demonstrates understanding of disease process, treatment plan, medications, and discharge instructions  Description: Complete learning assessment and assess knowledge base    Interventions:  - Provide teaching at level of understanding  - Provide teaching via preferred learning methods  8/28/2021 0245 by Alma Sheppard RN  Outcome: Progressing  8/27/2021 2133 by Alma Sheppard RN  Outcome: Progressing     Problem: DISCHARGE PLANNING  Goal: Discharge to home or other facility with appropriate resources  Description: INTERVENTIONS:  - Identify barriers to discharge w/patient and caregiver  - Arrange for needed discharge resources and transportation as appropriate  - Identify discharge learning needs (meds, wound care, etc )  - Arrange for interpretive services to assist at discharge as needed  - Refer to Case Management Department for coordinating discharge planning if the patient needs post-hospital services based on physician/advanced practitioner order or complex needs related to functional status, cognitive ability, or social support system  8/28/2021 0245 by Alma Sheppard RN  Outcome: Progressing  8/27/2021 2133 by Alma Sheppard RN  Outcome: Progressing

## 2021-08-28 NOTE — PLAN OF CARE
Problem: BIRTH - VAGINAL/ SECTION  Goal: Fetal and maternal status remain reassuring during the birth process  Description: INTERVENTIONS:  - Monitor vital signs  - Monitor fetal heart rate  - Monitor uterine activity  - Monitor labor progression (vaginal delivery)  - DVT prophylaxis  - Antibiotic prophylaxis  2021 by Rosendo Beard RN  Outcome: Completed  2021 by Rosendo Beard RN  Outcome: Progressing  Goal: Emotionally satisfying birthing experience for mother/fetus  Description: Interventions:  - Assess, plan, implement and evaluate the nursing care given to the patient in labor  - Advocate the philosophy that each childbirth experience is a unique experience and support the family's chosen level of involvement and control during the labor process   - Actively participate in both the patient's and family's teaching of the birth process  - Consider cultural, Quaker and age-specific factors and plan care for the patient in labor  2021 by Rosendo Beard RN  Outcome: Completed  2021 by Rosendo Beard RN  Outcome: Progressing

## 2021-08-28 NOTE — OB LABOR/OXYTOCIN SAFETY PROGRESS
Oxytocin Safety Progress Check Note - Raheem Plummer 27 y o  female MRN: [id number]    Unit/Bed#: -01 Encounter: [id number]    Dose (hadley-units/min) Oxytocin: 2 hadley-units/min  Contraction Frequency (minutes): 3-4  Contraction Quality: Moderate  Tachysystole: No   Cervical Dilation: 7        Cervical Effacement: 80  Fetal Station: -1  Baseline Rate: 140 bpm  Fetal Heart Rate: 145 BPM  FHR Category: Category I               Vital Signs:   Vitals:    08/27/21 2312   BP: 136/72   Pulse: 98   Resp:    Temp:            Notes/comments:   Patient feeling significantly increased discomfort, SVE now 7/80/-1  FHT cat 1, contractions every 3 min  Pit running at 2  Dr Leger Dress aware         Marli Mix MD 8/27/2021 11:43 PM

## 2021-08-28 NOTE — L&D DELIVERY NOTE
Vaginal Delivery Summary - OB/GYN   Rufus Born 27 y o  female MRN: [id number]  Unit/Bed#: -01 Encounter: [id number]      Predelivery Diagnosis:  1  Pregnancy at      Postdelivery Diagnosis:  1  Same as above  2  Delivery of fullterm     Procedure: Spontaneous Vaginal Delivery    Attending: Dr Soheila Douglass     Assistant: Dr Nolan Sever, Dr Noreen Multani    Anesthesia: None    QBL: 87cc  Admission H  Admission platelets: [phone]    Complications: none apparent    Specimens: cord blood, arterial and venous cord blood gasses, placenta to storage    Findings:   1  Viable female at 0130, with APGARS of 9 and 9 at 1 and 5 minutes respectively,  2  Spontaneous delivery of intact placenta   3  No lacerations    4  The cord was noted to have a true knot  4  Blood gases:    Umbilical Cord Venous Blood Gas:  Results from last 7 days   Lab Units 21  013   PH COV  7 453   PCO2 COV mm HG 32 9   HCO3 COV mmol/L 22 5   BASE EXC COV mmol/L -0 4*   O2 CT CD VB mL/dL 17 7   O2 HGB, VENOUS CORD % 52 8     Umbilical Cord Arterial Blood Gas:  Results from last 7 days   Lab Units 21  0135   PH COA  7 446*   PCO2 COA  33 6   PO2 COA mm HG 30 7*   HCO3 COA mmol/L 22 6   BASE EXC COA mmol/L -0 5*   O2 CONTENT CORD ART ml/dl 17 9   O2 HGB, ARTERIAL CORD % 74 5       Disposition:  Patient tolerated the procedure well and was recovering in labor and delivery room     Brief history and labor course:  Ms Hooper Born is a 27 y o   at 44wk3d  She presented to labor and delivery for eIOL  Pitocin was administered for labor induction  No epidural per patient's request  SROM for clear fluid at 2242, she was found to be complete at 0124 and began pushing at [facility]  Description of procedure    After pushing for 3 minutes, at 0130 patient delivered a viable female , wt pending, apgars of 9 (1 min) and 9 (5 min)  The fetal vertex delivered spontaneously   Baby was checked for nuchal  There was no nuchal cord  The anterior shoulder delivered atraumatically with maternal expulsive forces and the assistance of gentle downward traction  The posterior shoulder delivered with maternal expulsive forces and the assistance of gentle upward traction  The remainder of the fetus delivered spontaneously  Upon delivery, the infant was placed on the mothers abdomen and the cord was noted to have a knot, clamped and cut  Delayed cord clamping was performed  The infant was noted to cry spontaneously and was moving all extremities appropriately  There was no evidence for injury  Awaiting nurse resuscitators evaluated the   Arterial and venous cord blood gases and cord blood was collected for analysis  These were promptly sent to the lab  In the immediate post-partum, 30 units of IV pitocin was administered, and the uterus was noted to contract down well with massage and pitocin  The placenta delivered spontaneously at [address] [facility] and was noted to have a centrally inserted 3 vessel cord  The vagina, cervix, perineum, and rectum were inspected and there was noted to be no lacerations  At the conclusion of the procedure, all needle, sponge, and instrument counts were noted to be correct  Patient tolerated the procedure well and was allowed to recover in labor and delivery room with family and  before being transferred to the post-partum floor  Dr Vin Estrada and Dr Gerardo Wynne were present and participated in all key portions of the case  Carmela Sultana MD  2021  1:48 AM    Case and note reviewed agree  I was present and participated in the entire case

## 2021-08-28 NOTE — PLAN OF CARE
Problem: BIRTH - VAGINAL/ SECTION  Goal: Fetal and maternal status remain reassuring during the birth process  Description: INTERVENTIONS:  - Monitor vital signs  - Monitor fetal heart rate  - Monitor uterine activity  - Monitor labor progression (vaginal delivery)  - DVT prophylaxis  - Antibiotic prophylaxis  Outcome: Progressing  Goal: Emotionally satisfying birthing experience for mother/fetus  Description: Interventions:  - Assess, plan, implement and evaluate the nursing care given to the patient in labor  - Advocate the philosophy that each childbirth experience is a unique experience and support the family's chosen level of involvement and control during the labor process   - Actively participate in both the patient's and family's teaching of the birth process  - Consider cultural, Tenriism and age-specific factors and plan care for the patient in labor  Outcome: Progressing     Problem: PAIN - ADULT  Goal: Verbalizes/displays adequate comfort level or baseline comfort level  Description: Interventions:  - Encourage patient to monitor pain and request assistance  - Assess pain using appropriate pain scale  - Administer analgesics based on type and severity of pain and evaluate response  - Implement non-pharmacological measures as appropriate and evaluate response  - Consider cultural and social influences on pain and pain management  - Notify physician/advanced practitioner if interventions unsuccessful or patient reports new pain  Outcome: Progressing     Problem: INFECTION - ADULT  Goal: Absence or prevention of progression during hospitalization  Description: INTERVENTIONS:  - Assess and monitor for signs and symptoms of infection  - Monitor lab/diagnostic results  - Monitor all insertion sites, i e  indwelling lines, tubes, and drains  - Monitor endotracheal if appropriate and nasal secretions for changes in amount and color  - Fowler appropriate cooling/warming therapies per order  - Administer medications as ordered  - Instruct and encourage patient and family to use good hand hygiene technique  - Identify and instruct in appropriate isolation precautions for identified infection/condition  Outcome: Progressing     Problem: SAFETY ADULT  Goal: Patient will remain free of falls  Description: INTERVENTIONS:  - Educate patient/family on patient safety including physical limitations  - Instruct patient to call for assistance with activity   - Consult OT/PT to assist with strengthening/mobility   - Keep Call bell within reach  - Keep bed low and locked with side rails adjusted as appropriate  - Keep care items and personal belongings within reach  - Initiate and maintain comfort rounds  - Make Fall Risk Sign visible to staff  - Apply yellow socks and bracelet for high fall risk patients  - Consider moving patient to room near nurses station  Outcome: Progressing  Goal: Maintain or return to baseline ADL function  Description: INTERVENTIONS:  -  Assess patient's ability to carry out ADLs; assess patient's baseline for ADL function and identify physical deficits which impact ability to perform ADLs (bathing, care of mouth/teeth, toileting, grooming, dressing, etc )  - Assess/evaluate cause of self-care deficits   - Assess range of motion  - Assess patient's mobility; develop plan if impaired  - Assess patient's need for assistive devices and provide as appropriate  - Encourage maximum independence but intervene and supervise when necessary  - Involve family in performance of ADLs  - Assess for home care needs following discharge   - Consider OT consult to assist with ADL evaluation and planning for discharge  - Provide patient education as appropriate  Outcome: Progressing  Goal: Maintains/Returns to pre admission functional level  Description: INTERVENTIONS:  - Perform BMAT or MOVE assessment daily    - Set and communicate daily mobility goal to care team and patient/family/caregiver     - Collaborate with rehabilitation services on mobility goals if consulted  - Out of bed for toileting  - Record patient progress and toleration of activity level   Outcome: Progressing     Problem: Knowledge Deficit  Goal: Patient/family/caregiver demonstrates understanding of disease process, treatment plan, medications, and discharge instructions  Description: Complete learning assessment and assess knowledge base    Interventions:  - Provide teaching at level of understanding  - Provide teaching via preferred learning methods  Outcome: Progressing     Problem: DISCHARGE PLANNING  Goal: Discharge to home or other facility with appropriate resources  Description: INTERVENTIONS:  - Identify barriers to discharge w/patient and caregiver  - Arrange for needed discharge resources and transportation as appropriate  - Identify discharge learning needs (meds, wound care, etc )  - Arrange for interpretive services to assist at discharge as needed  - Refer to Case Management Department for coordinating discharge planning if the patient needs post-hospital services based on physician/advanced practitioner order or complex needs related to functional status, cognitive ability, or social support system  Outcome: Progressing

## 2021-08-28 NOTE — H&P
[facility] 27 y o  female MRN: [id number]  Unit/Bed#: -01 Encounter: [id number]      Assessment: 27 y o   at 39w2d admitted for eIOL  SVE: /-2  FHT: cat 1  Clinical EFW: 9lb 5oz, >97% (AC >97%, HC 90%)  Vertex confirmed by US  GBS status: positive  Postpartum contraception plan: Mirena 6 weeks postpartum      Plan:   · Admit  · CBC, RPR, Type & Screen  · Analgesia at maternal request  · Induction starting with pitocin  · Penicillin per protocol for GBS positive status    Dr Bonita Hernandez aware        SUBJECTIVE:    Chief Complaint: "I am here for my induction"    HPI: Dionicio Lao is a 27 y o  [id number] with an JOLENE of 2021, by Ultrasound at 39w2d who is being admitted for eIOL  She complains of uterine contractions, occurring every 10-15 minutes starting this morning and getting more intense (6/10 now), has no LOF, and reports no VB  She states she has felt good FM  Pregnancy complications: suspected macrosomia w/ EFW 9lb 5oz, >97% (AC >97%, HC 90%), anemia, obesity    Patient Active Problem List   Diagnosis    39 weeks gestation of pregnancy    GBS bacteriuria    Obesity in pregnancy    Anemia affecting pregnancy    Prenatal care in third trimester    Fetal macrosomia in pregnancy in third trimester       Baby complications/comments: suspected macrosomia w/ EFW 9lb 5oz, >97% (AC >97%, HC 90%)    Review of Systems   Constitutional: Negative for chills and fever  Eyes: Negative for visual disturbance  Respiratory: Negative for shortness of breath  Cardiovascular: Negative for chest pain  Gastrointestinal: Negative for abdominal pain, constipation, diarrhea, nausea and vomiting  Genitourinary: Negative for dysuria  Neurological: Negative for dizziness, light-headedness and headaches         OB History    Para Term  AB Living   3 2 2     2   SAB TAB Ectopic Multiple Live Births           2      # Outcome Date GA Lbr Ever/2nd Weight Sex Delivery Anes PTL Lv   3 Current            2 Term 12/22/11 40w0d   F Vag-Spont None N RICK   1 Term 09/12/07 39w0d   F Vag-Spont None N RICK      Obstetric Comments   Menstrual cycle: 15       Past Medical History:   Diagnosis Date    Anemia        Past Surgical History:   Procedure Laterality Date    NO PAST SURGERIES         Social History     Tobacco Use    Smoking status: Never Smoker    Smokeless tobacco: Never Used   Substance Use Topics    Alcohol use: Not Currently       No Known Allergies    Medications Prior to Admission   Medication    ferrous sulfate 325 (65 Fe) mg tablet    Prenatal Vit-Fe Fumarate-FA (PRENATAL VITAMIN PO)           OBJECTIVE:  Vitals: There is no height or weight on file to calculate BMI  Physical Exam:  Physical Exam  Constitutional:       General: She is not in acute distress  Appearance: Normal appearance  She is not ill-appearing  HENT:      Mouth/Throat:      Mouth: Mucous membranes are moist    Eyes:      Pupils: Pupils are equal, round, and reactive to light  Cardiovascular:      Rate and Rhythm: Normal rate  Pulmonary:      Effort: Pulmonary effort is normal    Abdominal:      General: There is no distension  Musculoskeletal:         General: No swelling  Right lower leg: No edema  Left lower leg: No edema  Neurological:      General: No focal deficit present  Mental Status: She is alert  Skin:     General: Skin is warm and dry  Coloration: Skin is not jaundiced or pale     Psychiatric:         Mood and Affect: Mood normal           SVE:  4/50/-2    FHT:  Baseline Rate: 145 bpm  FHR Category: Category I    TOCO:   Contraction Frequency (minutes): 7-8  Contraction Duration (seconds): 70  Contraction Quality: Moderate    Lab Results   Component Value Date    WBC 8 76 08/27/2021    HGB 11 0 (L) 08/27/2021    HCT 33 1 (L) 08/27/2021     08/27/2021     No results found for: NA, K, CL, CO2, BUN, CREATININE, GLUCOSE, AST, ALT    Prenatal Labs: Reviewed     Blood type: O+  Antibody: negative  Group B strep: positive  HIV: negative  Hepatitis B: negative  RPR: non-reactive  Rubella: Immune  Varicella: Unknown  1 hour Glucose: 185  3 hour glucose: 82, 151, 111, 112  Platelets: 392  Hgb: 11 3    >2 Midnights  INPATIENT       Maria Del Rosario Houston MD  OB/GYN PGY-1  8/27/2021  9:32 PM

## 2021-08-28 NOTE — OB LABOR/OXYTOCIN SAFETY PROGRESS
Oxytocin Safety Progress Check Note - Dionicio Lao 27 y o  female MRN: [id number]    Unit/Bed#: -01 Encounter: [id number]    Dose (hadley-units/min) Oxytocin: 2 hadley-units/min  Contraction Frequency (minutes): 3  Contraction Quality: Moderate  Tachysystole: No   Cervical Dilation: 9        Cervical Effacement: 90  Fetal Station: -1  Baseline Rate: 130 bpm  Fetal Heart Rate: 128 BPM  FHR Category: Category I               Vital Signs:   Vitals:    08/28/21 0042   BP: 139/70   Pulse: 96   Resp:    Temp:            Notes/comments:   Patient feeling increased pressure, SVE now 9/90/-1  FHT cat 1, contractions every 2-3 min  Pit running at 2  Dr Bonita Hernandez aware         Neto Ac MD 8/28/2021 12:47 AM

## 2021-08-28 NOTE — DISCHARGE INSTRUCTIONS
Vaginal Delivery   WHAT YOU SHOULD KNOW:   A vaginal delivery is the birth of your baby through your vagina (birth canal)  AFTER YOU LEAVE:   Medicines:  · NSAIDs  help decrease swelling and pain or fever  This medicine is available with or without a doctor's order  NSAIDs can cause stomach bleeding or kidney problems in certain people  If you take blood thinner medicine, always ask your healthcare provider if NSAIDs are safe for you  Always read the medicine label and follow directions  · Take your medicine as directed  Call your healthcare provider if you think your medicine is not helping or if you have side effects  Tell him if you are allergic to any medicine  Keep a list of the medicines, vitamins, and herbs you take  Include the amounts, and when and why you take them  Bring the list or the pill bottles to follow-up visits  Carry your medicine list with you in case of an emergency  Follow up with your primary healthcare provider:  Most women need to return 6 weeks after a vaginal delivery  Ask about how to care for your wounds or stitches  Write down your questions so you remember to ask them during your visits  Activity:  Rest as much as possible  Try to keep all activities short  You may be able to do some exercise soon after you have your baby  Talk with your primary healthcare provider before you start exercising  If you work outside the home, ask when you can return to your job  Kegel exercises:  Kegel exercises may help your vaginal and rectal muscles heal faster  You can do Kegel exercises by tightening and relaxing the muscles around your vagina  Kegel exercises help make the muscles stronger  Breast care:  When your milk comes in, your breasts may feel full and hard  Ask how to care for your breasts, even if you are not breastfeeding  Constipation:  Do not try to push the bowel movement out if it is too hard   High-fiber foods, extra liquids, and regular exercise can help you prevent constipation  Examples of high-fiber foods are fruit and bran  Prune juice and water are good liquids to drink  Regular exercise helps your digestive system work  You may also be told to take over-the-counter fiber and stool softener medicines  Take these items as directed  Hemorrhoids:  Pregnancy can cause severe hemorrhoids  You may have rectal pain because of the hemorrhoids  Ask how to prevent or treat hemorrhoids  Perineum care: Your perineum is the area between your vagina and anus  Keep the area clean and dry to help it heal and to prevent infection  Wash the area gently with soap and water when you bathe or shower  Rinse your perineum with warm water when you use the toilet  Your primary healthcare provider may suggest you use a warm sitz bath to help decrease pain  A sitz bath is a bathtub or basin filled to hip level  Stay in the sitz bath for 20 to 30 minutes, or as directed  Vaginal discharge: You will have vaginal discharge, called lochia, after your delivery  The lochia is bright red the first day or two after the birth  By the fourth day, the amount decreases, and it turns red-brown  Use a sanitary pad rather than a tampon to prevent a vaginal infection  It is normal to have lochia up to 8 weeks after your baby is born  Monthly periods: Your period may start again within 7 to 12 weeks after your baby is born  If you are breastfeeding, it may take longer for your period to start again  You can still get pregnant again even though you do not have your monthly period  Talk with your primary healthcare provider about a birth control method that will be good for you if you do not want to get pregnant  Mood changes: Many new mothers have some kind of mood changes after delivery  Some of these changes occur because of lack of sleep, hormone changes, and caring for a new baby  Some mood changes can be more serious, such as postpartum depression   Talk with your primary healthcare provider if you feel unable to care for yourself or your baby  Sexual activity:  You may need to avoid sex for 6 to 7 weeks after you have your baby  You may notice you have a decreased desire for sex, or sex may be painful  You may need to use a vaginal lubricant (gel) to help make sex more comfortable  Contact your primary healthcare provider if:   · You have heavy vaginal bleeding that fills 1 or more sanitary pads in 1 hour  · You have a fever  · Your pain does not go away, or gets worse  · The skin between your vagina and rectum is swollen, warm, or red  · You have swollen, hard, or painful breasts  · You feel very sad or depressed  · You feel more tired than usual      · You have questions or concerns about your condition or care  Seek care immediately or call 911 if:   · You have pus or yellow drainage coming from your vagina or wound  · You are urinating very little, or not at all  · Your arm or leg feels warm, tender, and painful  It may look swollen and red  · You feel lightheaded, have sudden and worsening chest pain, or trouble breathing  You may have more pain when you take deep breaths or cough, or you may cough up blood  © 2014 [address] is for End User's use only and may not be sold, redistributed or otherwise used for commercial purposes  All illustrations and images included in CareNotes® are the copyrighted property of [employer]  or Kofi Poole  The above information is an  only  It is not intended as medical advice for individual conditions or treatments  Talk to your doctor, nurse or pharmacist before following any medical regimen to see if it is safe and effective for you

## 2021-08-28 NOTE — DISCHARGE SUMMARY
Discharge Summary - OB/GYN  Raheem Plummer 27 y o  female MRN: [id number]  Unit/Bed#: -01 Encounter: [id number]    Admission Date: 2021     Discharge Date: 2021    Admitting Attending: Karishma Elizabeth MD    Delivering Attending: Dr Karishma Elizabeth    Discharging Attending: Estella Wisdom MD     Principal Diagnosis: Pregnancy at 39w3d    Secondary Diagnosis:   1  Suspected fetal macrosomia  2  GBS positive status  3  Anemia  4  Obesity    Procedures: spontaneous vaginal delivery    Anesthesia: none    Hospital course: Raheem Plummer is a 27 y o   at 38w3d who was initially admitted for elective induction of labor  On admission, she was 4/50/-2, and her induction was initiated with pitocin  She then made good cervical change, and she had spontaneous rupture of membranes at 2242  She then reached complete dilation and began to push  She delivered a viable female  on 2021 at 46  Weight 9lbs 8 6 oz via normal spontaneous vaginal delivery  She sustained no lacerations  Apgars were 9 (1 min) and 9 (5 min)   was transferred to  nursery  Patient tolerated the procedure well  Her post-delivery course was uncomplicated  Her postpartum pain was well controlled with oral analgesics  She received a depo provera injection for contraception to bridge to Mirena IUD placement at 6 weeks postpartum visit  On day of discharge, she was ambulating and able to reasonably perform all ADLs  She was voiding and had appropriate bowel function  Pain was well controlled  She was discharged home on postpartum day #2 without complications  Patient was instructed to follow up with her OB as an outpatient and was given appropriate warnings to call provider if she develops signs of infection or uncontrolled pain      Complications: none apparent    Condition at discharge: good     Discharge instructions/Information to patient and family:   See after visit summary for information provided to patient and family  Provisions for Follow-Up Care:  See after visit summary for information related to follow-up care and any pertinent home health orders  Disposition: See After Visit Summary for discharge disposition information      Planned Readmission: No    Discharge medications and instructions:   See VIC Dominguez MD  Obstetrics & Gynecology PGY-1  8/30/2021  6:36 AM

## 2021-08-29 PROCEDURE — 99024 POSTOP FOLLOW-UP VISIT: CPT | Performed by: OBSTETRICS & GYNECOLOGY

## 2021-08-29 RX ADMIN — IBUPROFEN 600 MG: 600 TABLET ORAL at 16:42

## 2021-08-29 RX ADMIN — DOCUSATE SODIUM 100 MG: 100 CAPSULE, LIQUID FILLED ORAL at 18:45

## 2021-08-29 RX ADMIN — ACETAMINOPHEN 650 MG: 325 TABLET, FILM COATED ORAL at 20:09

## 2021-08-29 RX ADMIN — DOCUSATE SODIUM 100 MG: 100 CAPSULE, LIQUID FILLED ORAL at 08:53

## 2021-08-29 NOTE — LACTATION NOTE
This note was copied from a baby's chart  CONSULT - LACTATION  Baby Girl Chas Tafoya) [address] [facility] 1 days female MRN: [id number]    [facility] NURSERY Room / Bed: (N)/(N) Encounter: [id number]    Maternal Information     MOTHER:  Cristina Pennington  Maternal Age: 27 y o    OB History: # 1 - Date: 07, Sex: Female, Weight: None, GA: 39w0d, Delivery: Vaginal, Spontaneous, Apgar1: None, Apgar5: None, Living: Living, Birth Comments: None    # 2 - Date: 11, Sex: Female, Weight: None, GA: 40w0d, Delivery: Vaginal, Spontaneous, Apgar1: None, Apgar5: None, Living: Living, Birth Comments: None    # 3 - Date: 21, Sex: Female, Weight: 4325 g (9 lb 8 6 oz), GA: 39w3d, Delivery: Vaginal, Spontaneous, Apgar1: 9, Apgar5: 9, Living: Living, Birth Comments: None   Previouse breast reduction surgery? No    Lactation history:   Has patient previously breast fed: Yes   How long had patient previously breast fed: 2nd baby for 1 year, did not breastfeed her 1st baby  Previous breast feeding complications: None     Past Surgical History:   Procedure Laterality Date    NO PAST SURGERIES          Birth information:  YOB: 2022   Time of birth: 1:30 AM   Sex: female   Delivery type: Vaginal, Spontaneous   Birth Weight: 4325 g (9 lb 8 6 oz)   Percent of Weight Change: -1%     Gestational Age: 38w3d   [unfilled]    Assessment     Breast and nipple assessment: normal assessment     Assessment: normal assessment    Feeding assessment: States baby was latching, supplementing with formula presently r/t high bili  LATCH:  Latch: Audible Swallowing:    Type of Nipple:    Comfort (Breast/Nipple):    Hold (Positioning):    LATCH Score:         Feeding recommendations:  Feed every 2-3 hours ( breast/pumped bm/formula )    Met with mother and provided her with the Ready, Set, Baby Booklet  Discussed Skin to Skin contact and benefits to mom and baby      Spoke about feeding on cue and what that means for recognizing infant's hunger  Positioning and latch reviewed as well as showing images of other feeding positions  Discussed the properties of a good latch in any position  Reviewed hand/manual expression  Discussed risks for early supplementation: over feeding, longer digestion times, engorgement for mom, lower milk supply for mom, and nipple confusion  Encouraged her to pump every 2-3 hours while baby under photo and being supplemented to establish/protect her milk supply  Gave information on common concerns, what to expect the first few weeks after delivery, preparing for other caregivers, and how partners can help  Resources for support also provided  Encouraged Yao Helton to call for assistance or with additional questions or concerns as needed        Rogers Lee RN 8/29/2021 4:31 PM

## 2021-08-29 NOTE — PLAN OF CARE
Problem: PAIN - ADULT  Goal: Verbalizes/displays adequate comfort level or baseline comfort level  Description: Interventions:  - Encourage patient to monitor pain and request assistance  - Assess pain using appropriate pain scale  - Administer analgesics based on type and severity of pain and evaluate response  - Implement non-pharmacological measures as appropriate and evaluate response  - Consider cultural and social influences on pain and pain management  - Notify physician/advanced practitioner if interventions unsuccessful or patient reports new pain  Outcome: Progressing     Problem: INFECTION - ADULT  Goal: Absence or prevention of progression during hospitalization  Description: INTERVENTIONS:  - Assess and monitor for signs and symptoms of infection  - Monitor lab/diagnostic results  - Monitor all insertion sites, i e  indwelling lines, tubes, and drains  - Monitor endotracheal if appropriate and nasal secretions for changes in amount and color  - [place] appropriate cooling/warming therapies per order  - Administer medications as ordered  - Instruct and encourage patient and family to use good hand hygiene technique  - Identify and instruct in appropriate isolation precautions for identified infection/condition  Outcome: Progressing     Problem: SAFETY ADULT  Goal: Patient will remain free of falls  Description: INTERVENTIONS:  - Educate patient/family on patient safety including physical limitations  - Instruct patient to call for assistance with activity   - Consult OT/PT to assist with strengthening/mobility   - Keep Call bell within reach  - Keep bed low and locked with side rails adjusted as appropriate  - Keep care items and personal belongings within reach  - Initiate and maintain comfort rounds  - Make Fall Risk Sign visible to staff  - Apply yellow socks and bracelet for high fall risk patients  - Consider moving patient to room near nurses station  Outcome: Progressing  Goal: Maintain or return to baseline ADL function  Description: INTERVENTIONS:  -  Assess patient's ability to carry out ADLs; assess patient's baseline for ADL function and identify physical deficits which impact ability to perform ADLs (bathing, care of mouth/teeth, toileting, grooming, dressing, etc )  - Assess/evaluate cause of self-care deficits   - Assess range of motion  - Assess patient's mobility; develop plan if impaired  - Assess patient's need for assistive devices and provide as appropriate  - Encourage maximum independence but intervene and supervise when necessary  - Involve family in performance of ADLs  - Assess for home care needs following discharge   - Consider OT consult to assist with ADL evaluation and planning for discharge  - Provide patient education as appropriate  Outcome: Progressing  Goal: Maintains/Returns to pre admission functional level  Description: INTERVENTIONS:  - Perform BMAT or MOVE assessment daily    - Set and communicate daily mobility goal to care team and patient/family/caregiver  - Collaborate with rehabilitation services on mobility goals if consulted  - Out of bed for toileting  - Record patient progress and toleration of activity level   Outcome: Progressing     Problem: Knowledge Deficit  Goal: Patient/family/caregiver demonstrates understanding of disease process, treatment plan, medications, and discharge instructions  Description: Complete learning assessment and assess knowledge base    Interventions:  - Provide teaching at level of understanding  - Provide teaching via preferred learning methods  Outcome: Progressing     Problem: DISCHARGE PLANNING  Goal: Discharge to home or other facility with appropriate resources  Description: INTERVENTIONS:  - Identify barriers to discharge w/patient and caregiver  - Arrange for needed discharge resources and transportation as appropriate  - Identify discharge learning needs (meds, wound care, etc )  - Arrange for interpretive services to assist at discharge as needed  - Refer to Case Management Department for coordinating discharge planning if the patient needs post-hospital services based on physician/advanced practitioner order or complex needs related to functional status, cognitive ability, or social support system  Outcome: Progressing     Problem: POSTPARTUM  Goal: Experiences normal postpartum course  Description: INTERVENTIONS:  - Monitor maternal vital signs  - Assess uterine involution and lochia  Outcome: Progressing  Goal: Appropriate maternal -  bonding  Description: INTERVENTIONS:  - Identify family support  - Assess for appropriate maternal/infant bonding   -Encourage maternal/infant bonding opportunities  - Referral to  or  as needed  Outcome: Progressing  Goal: Establishment of infant feeding pattern  Description: INTERVENTIONS:  - Assess breast/bottle feeding  - Refer to lactation as needed  Outcome: Progressing

## 2021-08-29 NOTE — PLAN OF CARE
Problem: PAIN - ADULT  Goal: Verbalizes/displays adequate comfort level or baseline comfort level  Description: Interventions:  - Encourage patient to monitor pain and request assistance  - Assess pain using appropriate pain scale  - Administer analgesics based on type and severity of pain and evaluate response  - Implement non-pharmacological measures as appropriate and evaluate response  - Consider cultural and social influences on pain and pain management  - Notify physician/advanced practitioner if interventions unsuccessful or patient reports new pain  Outcome: Progressing     Problem: INFECTION - ADULT  Goal: Absence or prevention of progression during hospitalization  Description: INTERVENTIONS:  - Assess and monitor for signs and symptoms of infection  - Monitor lab/diagnostic results  - Monitor all insertion sites, i e  indwelling lines, tubes, and drains  - Monitor endotracheal if appropriate and nasal secretions for changes in amount and color  - [place] appropriate cooling/warming therapies per order  - Administer medications as ordered  - Instruct and encourage patient and family to use good hand hygiene technique  - Identify and instruct in appropriate isolation precautions for identified infection/condition  Outcome: Progressing     Problem: SAFETY ADULT  Goal: Patient will remain free of falls  Description: INTERVENTIONS:  - Educate patient/family on patient safety including physical limitations  - Instruct patient to call for assistance with activity   - Consult OT/PT to assist with strengthening/mobility   - Keep Call bell within reach  - Keep bed low and locked with side rails adjusted as appropriate  - Keep care items and personal belongings within reach  - Initiate and maintain comfort rounds  - Make Fall Risk Sign visible to staff  - Apply yellow socks and bracelet for high fall risk patients  - Consider moving patient to room near nurses station  Outcome: Progressing  Goal: Maintain or return to baseline ADL function  Description: INTERVENTIONS:  -  Assess patient's ability to carry out ADLs; assess patient's baseline for ADL function and identify physical deficits which impact ability to perform ADLs (bathing, care of mouth/teeth, toileting, grooming, dressing, etc )  - Assess/evaluate cause of self-care deficits   - Assess range of motion  - Assess patient's mobility; develop plan if impaired  - Assess patient's need for assistive devices and provide as appropriate  - Encourage maximum independence but intervene and supervise when necessary  - Involve family in performance of ADLs  - Assess for home care needs following discharge   - Consider OT consult to assist with ADL evaluation and planning for discharge  - Provide patient education as appropriate  Outcome: Progressing  Goal: Maintains/Returns to pre admission functional level  Description: INTERVENTIONS:  - Perform BMAT or MOVE assessment daily    - Set and communicate daily mobility goal to care team and patient/family/caregiver  - Collaborate with rehabilitation services on mobility goals if consulted  - Out of bed for toileting  - Record patient progress and toleration of activity level   Outcome: Progressing     Problem: Knowledge Deficit  Goal: Patient/family/caregiver demonstrates understanding of disease process, treatment plan, medications, and discharge instructions  Description: Complete learning assessment and assess knowledge base    Interventions:  - Provide teaching at level of understanding  - Provide teaching via preferred learning methods  Outcome: Progressing     Problem: DISCHARGE PLANNING  Goal: Discharge to home or other facility with appropriate resources  Description: INTERVENTIONS:  - Identify barriers to discharge w/patient and caregiver  - Arrange for needed discharge resources and transportation as appropriate  - Identify discharge learning needs (meds, wound care, etc )  - Arrange for interpretive services to assist at discharge as needed  - Refer to Case Management Department for coordinating discharge planning if the patient needs post-hospital services based on physician/advanced practitioner order or complex needs related to functional status, cognitive ability, or social support system  Outcome: Progressing     Problem: POSTPARTUM  Goal: Experiences normal postpartum course  Description: INTERVENTIONS:  - Monitor maternal vital signs  - Assess uterine involution and lochia  Outcome: Progressing  Goal: Appropriate maternal -  bonding  Description: INTERVENTIONS:  - Identify family support  - Assess for appropriate maternal/infant bonding   -Encourage maternal/infant bonding opportunities  - Referral to  or  as needed  Outcome: Progressing  Goal: Establishment of infant feeding pattern  Description: INTERVENTIONS:  - Assess breast/bottle feeding  - Refer to lactation as needed  Outcome: Progressing

## 2021-08-29 NOTE — PROGRESS NOTES
Postpartum Progress Note       PROCEDURE: Spontaneous Vaginal Delivery    SUBJECTIVE: Patient currently denies any concerns  States overnight was uneventful      Pain: no    Tolerating Oral Intake: yes     Voiding: yes    Flatus: yes    Bowel Movement: no    Ambulating: yes    Breastfeeding: yes    Chest Pain: no    Shortness of Breath: no    Leg Pain/Discomfort: no    Lochia: moderate    Other:       OBJECTIVE:    Vitals:    08/28/21 1607 08/28/21 2031 08/29/21 0000 08/29/21 0901   BP: 124/63 135/71 120/66 130/88   BP Location: Left arm Left arm     Pulse: 80 79 74 65   Resp: 18 18 18 20   Temp: 98 °F (36 7 °C) 97 9 °F (36 6 °C) 97 8 °F (36 6 °C) 98 2 °F (36 8 °C)   TempSrc: Oral Oral Oral Oral   SpO2:       Weight:       Height:            General: No Acute Distress     Cardiovascular: Regular, Rate and Rhythm, no murmur, gallop or rub     Lungs: Clear to Auscultation Bilaterally, no wheezing, rhonchi or rales     Abdomen: Soft, non-distended, non-tender, no rebound, guarding or CVA tenderness     Fundus: Firm & Non-Tender     Fundal Location:   at the umbilicus    Lower Extremities: Non-Tender    LABS / TESTS / MEDICATION:      Admission on 08/27/2021   Component Date Value    ABO Grouping 08/27/2021 O     Rh Factor 08/27/2021 Positive     Antibody Screen 08/27/2021 Negative     Specimen Expiration Date 08/27/2021 [id number]     WBC 08/27/2021 8 76     RBC 08/27/2021 3 96     Hemoglobin 08/27/2021 11 0*    Hematocrit 08/27/2021 33 1*    MCV 08/27/2021 84     MCH 08/27/2021 27 8     MCHC 08/27/2021 33 2     RDW 08/27/2021 13 7     MPV 08/27/2021 10 8     Platelets 31/35/1338 179     nRBC 08/27/2021 0     Neutrophils Relative 08/27/2021 67     Immat GRANS % 08/27/2021 2     Lymphocytes Relative 08/27/2021 21     Monocytes Relative 08/27/2021 9     Eosinophils Relative 08/27/2021 1     Basophils Relative 08/27/2021 0     Neutrophils Absolute 08/27/2021 5 94     Immature Grans Absolute 08/27/2021 0 13     Lymphocytes Absolute 2021 1 86     Monocytes Absolute 2021 0 75     Eosinophils Absolute 2021 0 05     Basophils Absolute 2021 0 03     pH, Cord Art 2021 7 446*    pCO2, Cord Art 2021 33 6     pO2, Cord Art 2021 30 7*    HCO3, Cord Art 2021 22 6     Base Exc, Cord Art 2021 -0 5*    O2 Content, Cord Art 2021 17 9     O2 Hgb, Arterial Cord 2021 74 5     pH, Cord Salomon 2021 7 453     pCO2, Cord Salomon 2021 32 9     pO2, Cord Salomon 2021 29 7     HCO3, Cord Salomon 2021 22 5    [facility] Exc, Cord Salomon 2021 -0 4*    O2 Cont, Cord Salomon 2021 17 7     O2 HGB,VENOUS CORD 2021 73 3        Current Facility-Administered Medications   Medication Dose Route Frequency    acetaminophen (TYLENOL) tablet 650 mg  650 mg Oral Q6H PRN    benzocaine-menthol-lanolin-aloe (DERMOPLAST) 20-0 5 % topical spray   Topical 4x Daily PRN    calcium carbonate (TUMS) chewable tablet 1,000 mg  1,000 mg Oral Daily PRN    diphenhydrAMINE (BENADRYL) injection 25 mg  25 mg Intravenous Q6H PRN    docusate sodium (COLACE) capsule 100 mg  100 mg Oral BID    hydrocortisone 1 % cream 1 application  1 application Topical 4x Daily PRN    ibuprofen (MOTRIN) tablet 600 mg  600 mg Oral Q6H PRN    lactated ringers infusion  125 mL/hr Intravenous Continuous    ondansetron (ZOFRAN) injection 4 mg  4 mg Intravenous Q8H PRN    oxytocin (PITOCIN) 30 Units in lactated ringers 500 mL infusion  1-30 [place]-units/min Intravenous Titrated    witch hazel-glycerin (TUCKS) topical pad 1 pad  1 pad Topical Q2H PRN       ASSESSMENT AND PLAN:   Postpartum day # 1   Status post:  1  Continue Routine Postpartum Care  2  Encourage Ambulation  3  Advance diet as tolerated  4   Plan Contraception discussed: IUD, will decide by tomorrow about bridging method ( pill vs depot) pending IUD placement    Signature/Title: Willem Velarde, DO  Date: 8/29/2021  Time: 10:31 AM

## 2021-08-30 VITALS
DIASTOLIC BLOOD PRESSURE: 80 MMHG | TEMPERATURE: 97.9 F | HEIGHT: 69 IN | BODY MASS INDEX: 38.36 KG/M2 | HEART RATE: 65 BPM | OXYGEN SATURATION: 97 % | RESPIRATION RATE: 20 BRPM | SYSTOLIC BLOOD PRESSURE: 125 MMHG | WEIGHT: 259 LBS

## 2021-08-30 LAB — RPR SER QL: NORMAL

## 2021-08-30 PROCEDURE — 99024 POSTOP FOLLOW-UP VISIT: CPT | Performed by: OBSTETRICS & GYNECOLOGY

## 2021-08-30 RX ORDER — ACETAMINOPHEN 325 MG/1
650 TABLET ORAL EVERY 6 HOURS PRN
Refills: 0
Start: 2021-08-30

## 2021-08-30 RX ORDER — IBUPROFEN 600 MG/1
600 TABLET ORAL EVERY 6 HOURS PRN
Qty: 30 TABLET | Refills: 0
Start: 2021-08-30

## 2021-08-30 RX ORDER — MEDROXYPROGESTERONE ACETATE 150 MG/ML
150 INJECTION, SUSPENSION INTRAMUSCULAR ONCE
Status: DISCONTINUED | OUTPATIENT
Start: 2021-08-30 | End: 2021-08-31 | Stop reason: HOSPADM

## 2021-08-30 RX ADMIN — DOCUSATE SODIUM 100 MG: 100 CAPSULE, LIQUID FILLED ORAL at 18:24

## 2021-08-30 RX ADMIN — DOCUSATE SODIUM 100 MG: 100 CAPSULE, LIQUID FILLED ORAL at 08:55

## 2021-08-30 RX ADMIN — IBUPROFEN 600 MG: 600 TABLET ORAL at 08:55

## 2021-08-30 RX ADMIN — IBUPROFEN 600 MG: 600 TABLET ORAL at 18:24

## 2021-08-30 NOTE — PROGRESS NOTES
Progress Note - OB/GYN   Fuentes Levin 27 y o  female MRN: [id number]  Unit/Bed#: -01 Encounter: [id number]    Assessment:  PPD#2 s/p Spontaneous Vaginal Delivery, stable  Plan:    1) Postpartum care  - Encourage ambulation  - Encourage breastfeeding  - Continue current meds     2) Pain control   - Currently well controlled with PO pain medication     3) Contraception   - Plans for Mirena at 6 week pp visit   - will bridge with Depo prior to discharge     4) Disposition  - Anticipate discharge home today    Subjective/Objective     Subjective: Feels well this AM  Reports lochia wnl  Would like depo bridge to Mirena  Would like dc home today  Pain: no  Tolerating PO: yes  Voiding: yes  Flatus: yes  BM: yes  Ambulating: yes  Breastfeeding: Breastfeeding  Chest pain: no  Shortness of breath: no  Leg pain: no  Lochia: wnl    Objective:     Vitals:  Vitals:    08/29/21 0000 08/29/21 0901 08/29/21 1638 08/29/21 2330   BP: 120/66 130/88 142/71 114/62   BP Location:   Left arm    Pulse: 74 65 74 80   Resp: 18 20 20 20   Temp: 97 8 °F (36 6 °C) 98 2 °F (36 8 °C) 98 °F (36 7 °C) 98 1 °F (36 7 °C)   TempSrc: Oral Oral Oral Oral   SpO2:       Weight:       Height:           Physical Exam:   GEN: The patient was alert, pleasant well-appearing female in no acute distress     CV: Regular rate  PULM: nonlabored respirations  MSK: Normal gait  Skin: warm, dry  Neuro: no focal deficits  Psych: normal affect and judgement, cooperative  ABDOMEN: soft, no tenderness, no distention, Uterine fundus firm and non-tender, -2 cm below the umbilicus         Lab Results   Component Value Date    WBC 8 76 08/27/2021    HGB 11 0 (L) 08/27/2021    HCT 33 1 (L) 08/27/2021    MCV 84 08/27/2021     08/27/2021         Myrtha Blizzard, MD, PGY-1  Obstetrics & Gynecology  08/30/21

## 2021-08-30 NOTE — LACTATION NOTE
This note was copied from a baby's chart  Discharge Lactation: education on discharge provided  Met with mother to go over discharge breastfeeding booklet including the feeding log  Emphasized 8 or more (12) feedings in a 24 hour period, what to expect for the number of diapers per day of life and the progression of properties of the  stooling pattern  Reviewed breastfeeding and your lifestyle, storage and preparation of breast milk, how to keep you breast pump clean, the employed breastfeeding mother and paced bottle feeding handouts  Booklet included Breastfeeding Resources for after discharge including access to the number for the [id number]  Provided education on growth spurts, when to introduce bottles; paced bottle feeding, and non-nutritive suck at the breast  Provided education on Signs of satiation  Encouraged to call lactation to observe a latch prior to discharge for reassurance  Encouraged to call baby and me with any questions and closely monitor output  Feed expressed milk or formula as needed/desired  Paced bottle feeding technique is less stressful for your baby, prevents overfeeding and protects the breastfeeding relationship  You can find a video about paced bottle feeding at www lacted  org    Mom has order for Depo  Education on Depo's potential to reduce milk supply provided  Encouraged to call lactation for additional support

## 2021-09-03 LAB — PLACENTA IN STORAGE: NORMAL

## 2021-09-13 ENCOUNTER — TELEPHONE (OUTPATIENT)
Dept: OBGYN CLINIC | Facility: CLINIC | Age: 31
End: 2021-09-13

## 2021-09-13 NOTE — TELEPHONE ENCOUNTER
Transition of Care- Post Partum    Date of delivery: 8/28/2021     Type of delivery: vaginal delivery   Weeks gestation: 39w3d  Birth weight: 5gm  9lbs 8 6ounces         Sex of child: female       Scheduled for follow-up appointment: 9/17/2021    Andrew Osei RN

## 2021-09-17 ENCOUNTER — POSTPARTUM VISIT (OUTPATIENT)
Dept: OBGYN CLINIC | Facility: CLINIC | Age: 31
End: 2021-09-17

## 2021-09-17 VITALS
DIASTOLIC BLOOD PRESSURE: 82 MMHG | BODY MASS INDEX: 38.25 KG/M2 | SYSTOLIC BLOOD PRESSURE: 120 MMHG | HEIGHT: 69 IN | TEMPERATURE: 97.9 F | HEART RATE: 80 BPM

## 2021-09-17 PROCEDURE — 99213 OFFICE O/P EST LOW 20 MIN: CPT | Performed by: OBSTETRICS & GYNECOLOGY

## 2021-09-17 NOTE — PROGRESS NOTES
Assessment/Plan:    Postpartum care and examination  Pt presents for postpartum visit, currently 3 weeks Postpartum  No complaints today, doing well   Delivered via  on 21, no laceration, girl, 9lbs 8 6 oz  No complications during pregnancy   Plan for IUD insertion at 6 weeks postpartum   Negative depression screen today  Established care for the baby   Breastfeeding and bonding well  Diagnoses and all orders for this visit:    Postpartum care and examination        Subjective:      Patient ID: Arsenio Hood is a 27 y o  female  27year old female is here for her postpartum visit at 3 weeks  Pt denies any complaints including abdominal pain, increased vaginal bleeding, discharge, headaches, worsening pedal edema  Pt  Reports she did not get the DEPO shot prior to discharge, plan is to get IUD at 6 weeks postpartum  The following portions of the patient's history were reviewed and updated as appropriate:   She  has a past medical history of Anemia  She   Patient Active Problem List    Diagnosis Date Noted    Postpartum care and examination 2021    Fetal macrosomia in pregnancy in third trimester 2021    Prenatal care in third trimester 2021    Anemia affecting pregnancy 2021     (spontaneous vaginal delivery) 2021    GBS bacteriuria 2021    Obesity in pregnancy 2021     She  has a past surgical history that includes No past surgeries  Her family history includes Hypertension in her family and mother; No Known Problems in her daughter, daughter, father, half-brother, half-brother, half-sister, half-sister, and half-sister  She  reports that she has never smoked  She has never used smokeless tobacco  She reports previous alcohol use  She reports that she does not use drugs    Current Outpatient Medications   Medication Sig Dispense Refill    Prenatal Vit-Fe Fumarate-FA (PRENATAL VITAMIN PO) Take 1 tablet by mouth      acetaminophen (TYLENOL) 325 mg tablet Take 2 tablets (650 mg total) by mouth every 6 (six) hours as needed for mild pain, moderate pain or headaches  0    ferrous sulfate 325 (65 Fe) mg tablet Take 325 mg by mouth daily with breakfast (Patient not taking: Reported on 9/17/2021)      ibuprofen (MOTRIN) 600 mg tablet Take 1 tablet (600 mg total) by mouth every 6 (six) hours as needed for moderate pain (Patient not taking: Reported on 9/17/2021) 30 tablet 0     No current facility-administered medications for this visit  Current Outpatient Medications on File Prior to Visit   Medication Sig    Prenatal Vit-Fe Fumarate-FA (PRENATAL VITAMIN PO) Take 1 tablet by mouth    acetaminophen (TYLENOL) 325 mg tablet Take 2 tablets (650 mg total) by mouth every 6 (six) hours as needed for mild pain, moderate pain or headaches    ferrous sulfate 325 (65 Fe) mg tablet Take 325 mg by mouth daily with breakfast (Patient not taking: Reported on 9/17/2021)    ibuprofen (MOTRIN) 600 mg tablet Take 1 tablet (600 mg total) by mouth every 6 (six) hours as needed for moderate pain (Patient not taking: Reported on 9/17/2021)     No current facility-administered medications on file prior to visit  She has No Known Allergies       Review of Systems   Constitutional: Negative for chills and fever  HENT: Negative for sore throat  Eyes: Negative for pain and visual disturbance  Respiratory: Negative for cough and shortness of breath  Cardiovascular: Negative for chest pain and leg swelling  Gastrointestinal: Negative for abdominal pain and vomiting  Genitourinary: Negative for dysuria  Musculoskeletal: Negative for arthralgias  Skin: Negative for color change and rash  Neurological: Negative for headaches  Psychiatric/Behavioral: Negative for agitation and behavioral problems  All other systems reviewed and are negative          Objective:      /82   Pulse 80   Temp 97 9 °F (36 6 °C)   Ht 5' 9" (1 753 m)   LMP 11/16/2020 (Exact Date)   BMI 38 25 kg/m²          Physical Exam  Vitals reviewed  Constitutional:       Appearance: Normal appearance  HENT:      Head: Normocephalic and atraumatic  Mouth/Throat:      Mouth: Mucous membranes are moist    Eyes:      Conjunctiva/sclera: Conjunctivae normal    Cardiovascular:      Rate and Rhythm: Normal rate and regular rhythm  Pulses: Normal pulses  Heart sounds: Normal heart sounds  Pulmonary:      Effort: Pulmonary effort is normal  No respiratory distress  Breath sounds: Normal breath sounds  Abdominal:      General: There is no distension  Tenderness: There is no abdominal tenderness  Musculoskeletal:         General: Normal range of motion  Skin:     General: Skin is warm and dry  Capillary Refill: Capillary refill takes less than 2 seconds  Neurological:      Mental Status: She is alert  Cranial Nerves: No cranial nerve deficit  Motor: No weakness     Psychiatric:         Mood and Affect: Mood normal          Behavior: Behavior normal

## 2021-09-17 NOTE — ASSESSMENT & PLAN NOTE
Pt presents for postpartum visit, currently 3 weeks Postpartum  No complaints today, doing well   Delivered via  on 21, no laceration, girl, 9lbs 8 6 oz  No complications during pregnancy   Plan for IUD insertion at 6 weeks postpartum   Negative depression screen today  Established care for the baby   Breastfeeding and bonding well

## 2021-09-21 ENCOUNTER — PATIENT OUTREACH (OUTPATIENT)
Dept: OBGYN CLINIC | Facility: CLINIC | Age: 31
End: 2021-09-21

## 2021-11-10 ENCOUNTER — PROCEDURE VISIT (OUTPATIENT)
Dept: OBGYN CLINIC | Facility: CLINIC | Age: 31
End: 2021-11-10

## 2021-11-10 VITALS
DIASTOLIC BLOOD PRESSURE: 62 MMHG | HEIGHT: 69 IN | SYSTOLIC BLOOD PRESSURE: 111 MMHG | BODY MASS INDEX: 34.66 KG/M2 | HEART RATE: 77 BPM | WEIGHT: 234 LBS

## 2021-11-10 DIAGNOSIS — Z97.5 CONTRACEPTION, DEVICE INTRAUTERINE: Primary | ICD-10-CM

## 2021-11-10 LAB — SL AMB POCT URINE HCG: NEGATIVE

## 2021-11-10 PROCEDURE — 58300 INSERT INTRAUTERINE DEVICE: CPT | Performed by: OBSTETRICS & GYNECOLOGY

## 2021-11-10 PROCEDURE — 81025 URINE PREGNANCY TEST: CPT | Performed by: OBSTETRICS & GYNECOLOGY

## 2021-11-12 ENCOUNTER — PATIENT OUTREACH (OUTPATIENT)
Dept: OBGYN CLINIC | Facility: CLINIC | Age: 31
End: 2021-11-12

## 2023-07-19 NOTE — PATIENT INSTRUCTIONS
Thank you for choosing us for your  care today  If you have any questions about your ultrasound or care, please do not hesitate to contact us or your primary obstetrician  Some general instructions for your pregnancy are:     Protect against coronavirus: Continue to practice social distancing, wear a mask, and wash your hands often  Pregnant women are increased risk of severe COVID  Notify your primary care doctor if you have any symptoms including cough, shortness of breath or difficulty breathing, fever, chills, muscle pain, sore throat, or loss of taste or smell  Pregnant women can receive the coronavirus vaccine   Exercise: Aim for 22 minutes per day (150 minutes per week) of regular exercise  Walking is great!  Nutrition: aim for calcium-rich and iron-rich foods as well as healthy sources of protein   Protect against the flu: get yourself and your entire household vaccinated against influenza  This will protect your baby   Learn about Preeclampsia: preeclampsia is a common, serious high blood pressure complication in pregnancy  A blood pressure of 868KZVQ (systolic or top number) or 63KEDJ (diastolic or bottom number) is not normal and needs evaluation by your doctor   If you smoke, try to reduce how many cigarettes you smoke or try to quit completely  Do not vape   Other warning signs to watch out for in pregnancy or postpartum: chest pain, obstructed breathing or shortness of breath, seizures, thoughts of hurting yourself or your baby, bleeding, a painful or swollen leg, fever, or headache (see [facility] POST-BIRTH Warning Signs campaign)  If these happen call 911  Itching is also not normal in pregnancy and if you experience this, especially over your hands and feet, potentially worse at night, notify your doctors     Lastly, if you are contacted regarding participation in a survey about your experience in our office, please know that we take any feedback you provide seriously and use it to improve how we deliver care through our center  [employer] Pregnancy And Lactation Text: This medication is Pregnancy Category B and is considered safe. There is some data to suggest avoiding during the third trimester. It is unknown if this medication is excreted in breast milk.

## 2024-12-20 DIAGNOSIS — K80.50 CHOLEDOCHOLITHIASIS: Primary | ICD-10-CM

## 2024-12-21 ENCOUNTER — HOSPITAL ENCOUNTER (INPATIENT)
Facility: HOSPITAL | Age: 34
LOS: 2 days | Discharge: HOME | End: 2024-12-23
Attending: STUDENT IN AN ORGANIZED HEALTH CARE EDUCATION/TRAINING PROGRAM | Admitting: STUDENT IN AN ORGANIZED HEALTH CARE EDUCATION/TRAINING PROGRAM
Payer: COMMERCIAL

## 2024-12-21 DIAGNOSIS — K80.50 CHOLEDOCHOLITHIASIS: ICD-10-CM

## 2024-12-21 DIAGNOSIS — K80.01 CALCULUS OF GALLBLADDER WITH ACUTE CHOLECYSTITIS AND OBSTRUCTION: Primary | ICD-10-CM

## 2024-12-21 PROBLEM — E87.6 HYPOKALEMIA: Status: ACTIVE | Noted: 2024-12-21

## 2024-12-21 LAB
ALBUMIN SERPL BCG-MCNC: 4.6 G/DL (ref 3.5–5)
ALP SERPL-CCNC: 115 U/L (ref 34–104)
ALT SERPL W P-5'-P-CCNC: 93 U/L (ref 7–52)
ANION GAP SERPL CALCULATED.3IONS-SCNC: 9 MMOL/L (ref 4–13)
AST SERPL W P-5'-P-CCNC: 68 U/L (ref 13–39)
BASOPHILS # BLD AUTO: 0.03 THOUSANDS/ÂΜL (ref 0–0.1)
BASOPHILS NFR BLD AUTO: 0 % (ref 0–1)
BILIRUB DIRECT SERPL-MCNC: 0.41 MG/DL (ref 0–0.2)
BILIRUB SERPL-MCNC: 1.15 MG/DL (ref 0.2–1)
BUN SERPL-MCNC: 8 MG/DL (ref 5–25)
CALCIUM SERPL-MCNC: 9.3 MG/DL (ref 8.4–10.2)
CHLORIDE SERPL-SCNC: 102 MMOL/L (ref 96–108)
CO2 SERPL-SCNC: 27 MMOL/L (ref 21–32)
CREAT SERPL-MCNC: 0.76 MG/DL (ref 0.6–1.3)
EOSINOPHIL # BLD AUTO: 0.08 THOUSAND/ÂΜL (ref 0–0.61)
EOSINOPHIL NFR BLD AUTO: 1 % (ref 0–6)
ERYTHROCYTE [DISTWIDTH] IN BLOOD BY AUTOMATED COUNT: 12.6 % (ref 11.6–15.1)
GFR SERPL CREATININE-BSD FRML MDRD: 101 ML/MIN/1.73SQ M
GLUCOSE SERPL-MCNC: 83 MG/DL (ref 65–140)
HCT VFR BLD AUTO: 38.1 % (ref 34.8–46.1)
HGB BLD-MCNC: 12.8 G/DL (ref 11.5–15.4)
IMM GRANULOCYTES # BLD AUTO: 0.05 THOUSAND/UL (ref 0–0.2)
IMM GRANULOCYTES NFR BLD AUTO: 1 % (ref 0–2)
LYMPHOCYTES # BLD AUTO: 2.77 THOUSANDS/ÂΜL (ref 0.6–4.47)
LYMPHOCYTES NFR BLD AUTO: 29 % (ref 14–44)
MCH RBC QN AUTO: 28.3 PG (ref 26.8–34.3)
MCHC RBC AUTO-ENTMCNC: 33.6 G/DL (ref 31.4–37.4)
MCV RBC AUTO: 84 FL (ref 82–98)
MONOCYTES # BLD AUTO: 0.76 THOUSAND/ÂΜL (ref 0.17–1.22)
MONOCYTES NFR BLD AUTO: 8 % (ref 4–12)
NEUTROPHILS # BLD AUTO: 5.72 THOUSANDS/ÂΜL (ref 1.85–7.62)
NEUTS SEG NFR BLD AUTO: 61 % (ref 43–75)
NRBC BLD AUTO-RTO: 0 /100 WBCS
PLATELET # BLD AUTO: 175 THOUSANDS/UL (ref 149–390)
PMV BLD AUTO: 10.2 FL (ref 8.9–12.7)
POTASSIUM SERPL-SCNC: 3.3 MMOL/L (ref 3.5–5.3)
PROT SERPL-MCNC: 8.2 G/DL (ref 6.4–8.4)
RBC # BLD AUTO: 4.52 MILLION/UL (ref 3.81–5.12)
SODIUM SERPL-SCNC: 138 MMOL/L (ref 135–147)
WBC # BLD AUTO: 9.41 THOUSAND/UL (ref 4.31–10.16)

## 2024-12-21 PROCEDURE — 99223 1ST HOSP IP/OBS HIGH 75: CPT | Performed by: STUDENT IN AN ORGANIZED HEALTH CARE EDUCATION/TRAINING PROGRAM

## 2024-12-21 PROCEDURE — 82248 BILIRUBIN DIRECT: CPT | Performed by: STUDENT IN AN ORGANIZED HEALTH CARE EDUCATION/TRAINING PROGRAM

## 2024-12-21 PROCEDURE — 80053 COMPREHEN METABOLIC PANEL: CPT | Performed by: STUDENT IN AN ORGANIZED HEALTH CARE EDUCATION/TRAINING PROGRAM

## 2024-12-21 PROCEDURE — 85025 COMPLETE CBC W/AUTO DIFF WBC: CPT | Performed by: STUDENT IN AN ORGANIZED HEALTH CARE EDUCATION/TRAINING PROGRAM

## 2024-12-21 RX ORDER — POLYETHYLENE GLYCOL 3350 17 G/17G
17 POWDER, FOR SOLUTION ORAL DAILY
Status: DISCONTINUED | OUTPATIENT
Start: 2024-12-21 | End: 2024-12-23 | Stop reason: HOSPADM

## 2024-12-21 RX ORDER — ONDANSETRON 2 MG/ML
4 INJECTION INTRAMUSCULAR; INTRAVENOUS EVERY 6 HOURS PRN
Status: DISCONTINUED | OUTPATIENT
Start: 2024-12-21 | End: 2024-12-23 | Stop reason: HOSPADM

## 2024-12-21 RX ORDER — DIPHENHYDRAMINE HCL 25 MG
50 TABLET ORAL
Status: DISCONTINUED | OUTPATIENT
Start: 2024-12-21 | End: 2024-12-23 | Stop reason: HOSPADM

## 2024-12-21 RX ORDER — ACETAMINOPHEN 325 MG/1
650 TABLET ORAL EVERY 6 HOURS PRN
Status: DISCONTINUED | OUTPATIENT
Start: 2024-12-21 | End: 2024-12-23 | Stop reason: HOSPADM

## 2024-12-21 RX ADMIN — POLYETHYLENE GLYCOL 3350 17 G: 17 POWDER, FOR SOLUTION ORAL at 18:40

## 2024-12-21 RX ADMIN — PIPERACILLIN AND TAZOBACTAM 4.5 G: 36; 4.5 INJECTION, POWDER, LYOPHILIZED, FOR SOLUTION INTRAVENOUS at 17:10

## 2024-12-21 RX ADMIN — MELATONIN 9 MG: 3 TAB ORAL at 23:42

## 2024-12-21 RX ADMIN — ACETAMINOPHEN 650 MG: 325 TABLET, FILM COATED ORAL at 23:09

## 2024-12-21 NOTE — H&P
H&P - Hospitalist   Name: Cristina Pennington 34 y.o. female I MRN: [id number]  Unit/Bed#: [place] [address] -01 I Date of Admission: 12/21/2024   Date of Service: 12/21/2024 I Hospital Day: 0     Assessment & Plan  Calculus of gallbladder with acute cholecystitis and obstruction  Patient is a 34/F presenting to [facility] as a transfer from [facility] where she presented with RUQ pain and found to have acute cholecystitis with CBD dilatation to 1.1 cm. She underwent laparoscopic cholecystectomy with cholangiogram identifying stones in the CBD. No signs of cholangitis. She was transferred to [facility] for ERCP.   Lab trend from [facility] (12/20 to 12/21)  D. Bili 0.3 > 0.4  AST 52 > 51  ALT 67 > 74  Alk phos 118 > 115  Leukocytosis improved: 11.6 to 10.3, patient afebrile  Will continue Zosyn for now  Continue daily monitoring of LFTs, CBC  Monitor fever curve   Multimodal pain management and zofran prn for nausea  For now, patient can eat, NPO at midnight prior to procedure  GI consulted, recommendations appreciated, awaiting GI guidance on need for further imaging  Hypokalemia  Noted to be 3.4 from labs obtained from [place]  Will continue to monitor and replete accordingly    VTE Pharmacologic Prophylaxis:   Low Risk (Score 0-2) - Encourage Ambulation.  Code Status: Level 1 - Full Code   Discussion with family:  patient.     Anticipated Length of Stay: Patient will be admitted on an inpatient basis with an anticipated length of stay of greater than 2 midnights secondary to acute cholecystitis.    History of Present Illness   Chief Complaint: right upper quadrant pain    Cristina Pennington is a 34 y.o. female with no significant PMH who presented to [facility] Wednesday evening (12/18) with right upper quadrant pain. Per patient, pain had started months prior however never persistent and was intermittently occurring, worse with food intake. On Wednesday night however pain was persistent and associated  with nausea and vomiting prompting them to present to [place] ED where imaging revealed gall bladder wall thickening concerning for acute cholecystitis with 1.1 cm CBD dilatation. Patient was started on Zosyn and later taken to the OR for laparoscopic cholecystectomy. Intra-op cholangiogram performed and stones in the CBD were identified. This was discussed with GI and recommendation was for patient to undergo ERCP. Patient then accepted to our facility for the said procedure.     At this time, patient is comfortable with just minimal pain over RUQ as well as over incision sites. She is tolerating po well with no N/V. Pain is rated 5/10 per patient and controlled with just tylenol. Case discussed with GI and they will proceed with EGD on Monday.     Review of Systems   Constitutional:  Negative for chills and fever.   HENT:  Negative for ear pain and sore throat.    Eyes:  Negative for pain and visual disturbance.   Respiratory:  Negative for cough and shortness of breath.    Cardiovascular:  Negative for chest pain and palpitations.   Gastrointestinal:  Positive for abdominal pain. Negative for vomiting.   Genitourinary:  Negative for dysuria and hematuria.   Musculoskeletal:  Negative for arthralgias and back pain.   Skin:  Negative for color change and rash.   Neurological:  Negative for seizures and syncope.   All other systems reviewed and are negative.      Historical Information   Past Medical History:   Diagnosis Date    Anemia      Past Surgical History:   Procedure Laterality Date    NO PAST SURGERIES       Social History     Tobacco Use    Smoking status: Never    Smokeless tobacco: Never   Vaping Use    Vaping status: Never Used   Substance and Sexual Activity    Alcohol use: Not Currently    Drug use: Never    Sexual activity: Yes     Partners: Male     Birth control/protection: None, Condom Male     Comment: 1 week ago     E-Cigarette/Vaping    E-Cigarette Use Never User      E-Cigarette/Vaping  Substances    Nicotine No     THC No     CBD No     Flavoring No     Other No     Unknown No      Family history non-contributory  Social History:  Marital Status: Single   Patient Pre-hospital Living Situation: Home  Patient Pre-hospital Level of Mobility: walks  Patient Pre-hospital Diet Restrictions: none    Meds/Allergies   I have reviewed home medications with patient personally.  Prior to Admission medications    Medication Sig Start Date End Date Taking? Authorizing Provider   acetaminophen (TYLENOL) 325 mg tablet Take 2 tablets (650 mg total) by mouth every 6 (six) hours as needed for mild pain, moderate pain or headaches  Patient not taking: Reported on 11/10/2021  8/30/21   Meagan Nelson MD   ferrous sulfate 325 (65 Fe) mg tablet Take 325 mg by mouth daily with breakfast  Patient not taking: Reported on 9/17/2021    Historical Provider, MD   ibuprofen (MOTRIN) 600 mg tablet Take 1 tablet (600 mg total) by mouth every 6 (six) hours as needed for moderate pain  Patient not taking: Reported on 9/17/2021 8/30/21   Meagan Nelson MD   Prenatal Vit-Fe Fumarate-FA (PRENATAL VITAMIN PO) Take 1 tablet by mouth    Historical Provider, MD     No Known Allergies    Objective :  Temp:  [97.9 °F (36.6 °C)] 97.9 °F (36.6 °C)  HR:  [76] 76  BP: (111)/(70) 111/70  SpO2:  [97 %] 97 %    Physical Exam  Vitals and nursing note reviewed.   Constitutional:       General: She is not in acute distress.     Appearance: She is well-developed.   HENT:      Head: Normocephalic and atraumatic.   Eyes:      Conjunctiva/sclera: Conjunctivae normal.   Cardiovascular:      Rate and Rhythm: Normal rate and regular rhythm.      Heart sounds: No murmur heard.  Pulmonary:      Effort: Pulmonary effort is normal. No respiratory distress.      Breath sounds: Normal breath sounds.   Abdominal:      Palpations: Abdomen is soft.      Tenderness: There is abdominal tenderness.      Comments: Tenderness noted over RUQ as well as over laparoscopic  "incision sites   Musculoskeletal:         General: No swelling.      Cervical back: Neck supple.   Skin:     General: Skin is warm and dry.      Capillary Refill: Capillary refill takes less than 2 seconds.   Neurological:      Mental Status: She is alert.   Psychiatric:         Mood and Affect: Mood normal.        Lines/Drains:      Lab Results: I have reviewed the following results:                  No results found for: \"HGBA1C\"        Imaging Results Review: I reviewed radiology reports from this admission including: reviewed imaging reports from [place].  Other Study Results Review: No additional pertinent studies reviewed.    Administrative Statements     ** Please Note: This note has been constructed using a voice recognition system. **    "

## 2024-12-21 NOTE — ASSESSMENT & PLAN NOTE
Patient is a 34/F presenting to [facility] as a transfer from [facility] where she presented with RUQ pain and found to have acute cholecystitis with CBD dilatation to 1.1 cm. She underwent laparoscopic cholecystectomy with cholangiogram identifying stones in the CBD. No signs of cholangitis. She was transferred to [facility] for ERCP.   Lab trend from [facility] (12/20 to 12/21)  D. Bili 0.3 > 0.4  AST 52 > 51 > 68   ALT 67 > 74 > 93  Alk phos 118 > 115 > 115  Leukocytosis improved: 11.6 > 10.3 > 9.41 today patient afebrile  Will continue Zosyn for now  Continue daily monitoring of LFTs, CBC  Monitor fever curve   Multimodal pain management and zofran prn for nausea  For now, patient can eat, NPO at midnight prior to procedure  GI consulted, recommendations appreciated  Imaging from [place] to be uploaded into [employer] by Radiology department

## 2024-12-21 NOTE — PLAN OF CARE
Problem: PAIN - ADULT  Goal: Verbalizes/displays adequate comfort level or baseline comfort level  Description: Interventions:  - Encourage patient to monitor pain and request assistance  - Assess pain using appropriate pain scale  - Administer analgesics based on type and severity of pain and evaluate response  - Implement non-pharmacological measures as appropriate and evaluate response  - Consider cultural and social influences on pain and pain management  - Notify physician/advanced practitioner if interventions unsuccessful or patient reports new pain  Outcome: Progressing     Problem: INFECTION - ADULT  Goal: Absence or prevention of progression during hospitalization  Description: INTERVENTIONS:  - Assess and monitor for signs and symptoms of infection  - Monitor lab/diagnostic results  - Monitor all insertion sites, i.e. indwelling lines, tubes, and drains  - Monitor endotracheal if appropriate and nasal secretions for changes in amount and color  - [place] appropriate cooling/warming therapies per order  - Administer medications as ordered  - Instruct and encourage patient and family to use good hand hygiene technique  - Identify and instruct in appropriate isolation precautions for identified infection/condition  Outcome: Progressing     Problem: SAFETY ADULT  Goal: Patient will remain free of falls  Description: INTERVENTIONS:  - Educate patient/family on patient safety including physical limitations  - Instruct patient to call for assistance with activity   - Consult OT/PT to assist with strengthening/mobility   - Keep Call bell within reach  - Keep bed low and locked with side rails adjusted as appropriate  - Keep care items and personal belongings within reach  - Initiate and maintain comfort rounds  - Make Fall Risk Sign visible to staff  - Offer Toileting every  Hours, in advance of need  - Initiate/Maintain alarm  - Obtain necessary fall risk management equipment:   - Apply yellow socks and  bracelet for high fall risk patients  - Consider moving patient to room near nurses station  Outcome: Progressing  Goal: Maintain or return to baseline ADL function  Description: INTERVENTIONS:  -  Assess patient's ability to carry out ADLs; assess patient's baseline for ADL function and identify physical deficits which impact ability to perform ADLs (bathing, care of mouth/teeth, toileting, grooming, dressing, etc.)  - Assess/evaluate cause of self-care deficits   - Assess range of motion  - Assess patient's mobility; develop plan if impaired  - Assess patient's need for assistive devices and provide as appropriate  - Encourage maximum independence but intervene and supervise when necessary  - Involve family in performance of ADLs  - Assess for home care needs following discharge   - Consider OT consult to assist with ADL evaluation and planning for discharge  - Provide patient education as appropriate  Outcome: Progressing  Goal: Maintains/Returns to pre admission functional level  Description: INTERVENTIONS:  - Perform AM-PAC 6 Click Basic Mobility/ Daily Activity assessment daily.  - Set and communicate daily mobility goal to care team and patient/family/caregiver.   - Collaborate with rehabilitation services on mobility goals if consulted  - Perform Range of Motion times a day.  - Reposition patient every  hours.  - Dangle patient  times a day  - Stand patient  times a day  - Ambulate patient  times a day  - Out of bed to chair  times a day   - Out of bed for meals  times a day  - Out of bed for toileting  - Record patient progress and toleration of activity level   Outcome: Progressing     Problem: DISCHARGE PLANNING  Goal: Discharge to home or other facility with appropriate resources  Description: INTERVENTIONS:  - Identify barriers to discharge w/patient and caregiver  - Arrange for needed discharge resources and transportation as appropriate  - Identify discharge learning needs (meds, wound care, etc.)  -  Arrange for interpretive services to assist at discharge as needed  - Refer to Case Management Department for coordinating discharge planning if the patient needs post-hospital services based on physician/advanced practitioner order or complex needs related to functional status, cognitive ability, or social support system  Outcome: Progressing

## 2024-12-22 PROBLEM — K80.50 CHOLEDOCHOLITHIASIS: Status: ACTIVE | Noted: 2024-12-22

## 2024-12-22 LAB
ALBUMIN SERPL BCG-MCNC: 4.1 G/DL (ref 3.5–5)
ALP SERPL-CCNC: 105 U/L (ref 34–104)
ALT SERPL W P-5'-P-CCNC: 102 U/L (ref 7–52)
ANION GAP SERPL CALCULATED.3IONS-SCNC: 7 MMOL/L (ref 4–13)
AST SERPL W P-5'-P-CCNC: 74 U/L (ref 13–39)
BASOPHILS # BLD AUTO: 0.03 THOUSANDS/ÂΜL (ref 0–0.1)
BASOPHILS NFR BLD AUTO: 0 % (ref 0–1)
BILIRUB SERPL-MCNC: 0.98 MG/DL (ref 0.2–1)
BUN SERPL-MCNC: 10 MG/DL (ref 5–25)
CALCIUM SERPL-MCNC: 9.1 MG/DL (ref 8.4–10.2)
CHLORIDE SERPL-SCNC: 104 MMOL/L (ref 96–108)
CO2 SERPL-SCNC: 26 MMOL/L (ref 21–32)
CREAT SERPL-MCNC: 0.77 MG/DL (ref 0.6–1.3)
EOSINOPHIL # BLD AUTO: 0.1 THOUSAND/ÂΜL (ref 0–0.61)
EOSINOPHIL NFR BLD AUTO: 1 % (ref 0–6)
ERYTHROCYTE [DISTWIDTH] IN BLOOD BY AUTOMATED COUNT: 12.6 % (ref 11.6–15.1)
GFR SERPL CREATININE-BSD FRML MDRD: 100 ML/MIN/1.73SQ M
GLUCOSE SERPL-MCNC: 75 MG/DL (ref 65–140)
HCT VFR BLD AUTO: 35.5 % (ref 34.8–46.1)
HGB BLD-MCNC: 11.8 G/DL (ref 11.5–15.4)
IMM GRANULOCYTES # BLD AUTO: 0.05 THOUSAND/UL (ref 0–0.2)
IMM GRANULOCYTES NFR BLD AUTO: 1 % (ref 0–2)
LYMPHOCYTES # BLD AUTO: 3.34 THOUSANDS/ÂΜL (ref 0.6–4.47)
LYMPHOCYTES NFR BLD AUTO: 39 % (ref 14–44)
MAGNESIUM SERPL-MCNC: 1.9 MG/DL (ref 1.9–2.7)
MCH RBC QN AUTO: 28.2 PG (ref 26.8–34.3)
MCHC RBC AUTO-ENTMCNC: 33.2 G/DL (ref 31.4–37.4)
MCV RBC AUTO: 85 FL (ref 82–98)
MONOCYTES # BLD AUTO: 0.63 THOUSAND/ÂΜL (ref 0.17–1.22)
MONOCYTES NFR BLD AUTO: 7 % (ref 4–12)
NEUTROPHILS # BLD AUTO: 4.34 THOUSANDS/ÂΜL (ref 1.85–7.62)
NEUTS SEG NFR BLD AUTO: 52 % (ref 43–75)
NRBC BLD AUTO-RTO: 0 /100 WBCS
PLATELET # BLD AUTO: 185 THOUSANDS/UL (ref 149–390)
PMV BLD AUTO: 11.1 FL (ref 8.9–12.7)
POTASSIUM SERPL-SCNC: 3.4 MMOL/L (ref 3.5–5.3)
PROT SERPL-MCNC: 7.3 G/DL (ref 6.4–8.4)
RBC # BLD AUTO: 4.19 MILLION/UL (ref 3.81–5.12)
SODIUM SERPL-SCNC: 137 MMOL/L (ref 135–147)
WBC # BLD AUTO: 8.49 THOUSAND/UL (ref 4.31–10.16)

## 2024-12-22 PROCEDURE — 85025 COMPLETE CBC W/AUTO DIFF WBC: CPT | Performed by: STUDENT IN AN ORGANIZED HEALTH CARE EDUCATION/TRAINING PROGRAM

## 2024-12-22 PROCEDURE — 80053 COMPREHEN METABOLIC PANEL: CPT | Performed by: STUDENT IN AN ORGANIZED HEALTH CARE EDUCATION/TRAINING PROGRAM

## 2024-12-22 PROCEDURE — 99222 1ST HOSP IP/OBS MODERATE 55: CPT | Performed by: STUDENT IN AN ORGANIZED HEALTH CARE EDUCATION/TRAINING PROGRAM

## 2024-12-22 PROCEDURE — 83735 ASSAY OF MAGNESIUM: CPT | Performed by: STUDENT IN AN ORGANIZED HEALTH CARE EDUCATION/TRAINING PROGRAM

## 2024-12-22 PROCEDURE — 99232 SBSQ HOSP IP/OBS MODERATE 35: CPT | Performed by: STUDENT IN AN ORGANIZED HEALTH CARE EDUCATION/TRAINING PROGRAM

## 2024-12-22 RX ORDER — POTASSIUM CHLORIDE 1500 MG/1
40 TABLET, EXTENDED RELEASE ORAL ONCE
Status: COMPLETED | OUTPATIENT
Start: 2024-12-22 | End: 2024-12-22

## 2024-12-22 RX ADMIN — PIPERACILLIN AND TAZOBACTAM 4.5 G: 36; 4.5 INJECTION, POWDER, LYOPHILIZED, FOR SOLUTION INTRAVENOUS at 09:30

## 2024-12-22 RX ADMIN — PIPERACILLIN AND TAZOBACTAM 4.5 G: 36; 4.5 INJECTION, POWDER, LYOPHILIZED, FOR SOLUTION INTRAVENOUS at 02:47

## 2024-12-22 RX ADMIN — POTASSIUM CHLORIDE 40 MEQ: 1500 TABLET, EXTENDED RELEASE ORAL at 09:30

## 2024-12-22 RX ADMIN — MELATONIN 9 MG: 3 TAB ORAL at 22:00

## 2024-12-22 NOTE — ASSESSMENT & PLAN NOTE
Patient is a 34/F presenting to [facility] as a transfer from [facility] where she presented with RUQ pain and found to have acute cholecystitis with CBD dilatation to 1.1 cm. She underwent laparoscopic cholecystectomy with cholangiogram identifying stones in the CBD. No signs of cholangitis. She was transferred to [facility] for ERCP.   Lab trend from [facility] (12/20 to 12/21), slight bump in LFTs 12/22 am  D. Bili 0.3 > 0.4  AST 52 > 51 > 68   ALT 67 > 74 > 93  Alk phos 118 > 115 > 115  Leukocytosis improved from 11.6 when admitted to [facility]   Per GI, can d/c antibiotics given no evidence of cholangitis  Continue daily monitoring of LFTs, CBC  Monitor fever curve   Multimodal pain management and zofran prn for nausea  For now, patient can eat, NPO at midnight prior to procedure  GI consulted, recommendations appreciated  Imaging studies performed over at [facility]

## 2024-12-22 NOTE — PROGRESS NOTES
Progress Note - Hospitalist   Name: Cristina Pennington 34 y.o. female I MRN: [id number]  Unit/Bed#: [place] [address] -01 I Date of Admission: 12/21/2024   Date of Service: 12/22/2024 I Hospital Day: 1    Assessment & Plan  Calculus of gallbladder with acute cholecystitis and obstruction  Patient is a 34/F presenting to [facility] as a transfer from [facility] where she presented with RUQ pain and found to have acute cholecystitis with CBD dilatation to 1.1 cm. She underwent laparoscopic cholecystectomy with cholangiogram identifying stones in the CBD. No signs of cholangitis. She was transferred to [facility] for ERCP.   Lab trend from [facility] (12/20 to 12/21), slight bump in LFTs 12/22 am  D. Bili 0.3 > 0.4  AST 52 > 51 > 68   ALT 67 > 74 > 93  Alk phos 118 > 115 > 115  Leukocytosis improved from 11.6 when admitted to [facility]   Per GI, can d/c antibiotics given no evidence of cholangitis  Continue daily monitoring of LFTs, CBC  Monitor fever curve   Multimodal pain management and zofran prn for nausea  For now, patient can eat, NPO at midnight prior to procedure  GI consulted, recommendations appreciated  Imaging studies performed over at [place]  Hypokalemia  Continue to monitor and replete accordingly  Choledocholithiasis  CBD dilatation on intraoperative cholangiogram performed at [place]  GI following  ERCP planned for 12/23    VTE Pharmacologic Prophylaxis:   Low Risk (Score 0-2) - Encourage Ambulation.    Mobility:   Basic Mobility Inpatient Raw Score: 24  JH-HLM Goal: 8: Walk 250 feet or more  JH-HLM Achieved: 8: Walk 250 feet ot more  JH-HLM Goal achieved. Continue to encourage appropriate mobility.    Patient Centered Rounds: I performed bedside rounds with nursing staff today.   Discussions with Specialists or Other Care Team Provider: GI    Education and Discussions with Family / Patient: Patient declined call to .     Current Length of Stay: 1 day(s)  Current  Patient Status: Inpatient   Certification Statement: The patient will continue to require additional inpatient hospital stay due to choledocholithiasis, anticipate ERCP on 12/23  Discharge Plan: Anticipate discharge in 24-48 hrs to home.    Code Status: Level 1 - Full Code    Subjective   Patient seen and examined at bedside. Slight pain over laparoscopic incision site. Other than this, she is feeling well. Tolerating po. Denies N/V. Has been coming down with a cough over the last 2-3 days, no colds/congestion, no fevers, chills. No shortness of breath. Is now having bowel movements.    Objective :  Temp:  [97.9 °F (36.6 °C)-98.3 °F (36.8 °C)] 98.3 °F (36.8 °C)  HR:  [66-76] 66  BP: (111-127)/(70-84) 124/76  Resp:  [18-20] 18  SpO2:  [93 %-98 %] 93 %    Body mass index is 36.43 kg/m².     Input and Output Summary (last 24 hours):     Intake/Output Summary (Last 24 hours) at 12/22/2024 1121  Last data filed at 12/22/2024 0901  Gross per 24 hour   Intake 1200 ml   Output 2050 ml   Net -850 ml       Physical Exam  Vitals and nursing note reviewed.   Constitutional:       General: She is not in acute distress.     Appearance: She is well-developed.   HENT:      Head: Normocephalic and atraumatic.   Eyes:      Conjunctiva/sclera: Conjunctivae normal.   Cardiovascular:      Rate and Rhythm: Normal rate and regular rhythm.      Heart sounds: No murmur heard.  Pulmonary:      Effort: Pulmonary effort is normal. No respiratory distress.      Breath sounds: Normal breath sounds.   Abdominal:      General: Bowel sounds are normal. There is no distension.      Palpations: Abdomen is soft.      Tenderness: There is abdominal tenderness. There is no guarding.      Comments: Some tenderness close to incision sites   Musculoskeletal:         General: No swelling.      Cervical back: Neck supple.   Skin:     General: Skin is warm and dry.      Capillary Refill: Capillary refill takes less than 2 seconds.   Neurological:      Mental  Status: She is alert.   Psychiatric:         Mood and Affect: Mood normal.           Lines/Drains:              Lab Results: I have reviewed the following results:   Results from last 7 days   Lab Units 12/22/24  0410   WBC Thousand/uL 8.49   HEMOGLOBIN g/dL 11.8   HEMATOCRIT % 35.5   PLATELETS Thousands/uL 185   SEGS PCT % 52   LYMPHO PCT % 39   MONO PCT % 7   EOS PCT % 1     Results from last 7 days   Lab Units 12/22/24  0410   SODIUM mmol/L 137   POTASSIUM mmol/L 3.4*   CHLORIDE mmol/L 104   CO2 mmol/L 26   BUN mg/dL 10   CREATININE mg/dL 0.77   ANION GAP mmol/L 7   CALCIUM mg/dL 9.1   ALBUMIN g/dL 4.1   TOTAL BILIRUBIN mg/dL 0.98   ALK PHOS U/L 105*   ALT U/L 102*   AST U/L 74*   GLUCOSE RANDOM mg/dL 75                       Recent Cultures (last 7 days):         Imaging Results Review: No pertinent imaging studies reviewed.  Other Study Results Review: No additional pertinent studies reviewed.    Last 24 Hours Medication List:     Current Facility-Administered Medications:     acetaminophen (TYLENOL) tablet 650 mg, Q6H PRN    diphenhydrAMINE (BENADRYL) tablet 50 mg, HS PRN    melatonin tablet 9 mg, HS    ondansetron (ZOFRAN) injection 4 mg, Q6H PRN    polyethylene glycol (MIRALAX) packet 17 g, Daily    Administrative Statements   Today, Patient Was Seen By: Citlalli Whitfield MD    **Please Note: This note may have been constructed using a voice recognition system.**

## 2024-12-22 NOTE — ASSESSMENT & PLAN NOTE
Cristina Pennington is a34 yo female with PMH recent acute cholecystitis who presented to [facility] on 12/21 as transfer from [place] for GI evaluation for ERCP. Patient recently presented to [place] for RUQ abdominal pain and found to have acute cholecystitis with CBD dilation on imaging to 1.1cm. She underwent lap tamica on 12/19 with IOC noting multiple filling defects. Fluoro personally interpreted with 3 stones noted. Labs at time of presentation with AST 68, ALT 93, , Tbili 1.15 (increased when compared to records from [place]).  Plan for ERCP tomorrow 12/23  Please keep NPO at midnight  No need for antibiotics from GI perspective without evidence of cholangitis  Pain control per primary team  Hold AC/AP  Monitor CBC/LFT's

## 2024-12-22 NOTE — PLAN OF CARE
Problem: PAIN - ADULT  Goal: Verbalizes/displays adequate comfort level or baseline comfort level  Description: Interventions:  - Encourage patient to monitor pain and request assistance  - Assess pain using appropriate pain scale  - Administer analgesics based on type and severity of pain and evaluate response  - Implement non-pharmacological measures as appropriate and evaluate response  - Consider cultural and social influences on pain and pain management  - Notify physician/advanced practitioner if interventions unsuccessful or patient reports new pain  Outcome: Progressing     Problem: INFECTION - ADULT  Goal: Absence or prevention of progression during hospitalization  Description: INTERVENTIONS:  - Assess and monitor for signs and symptoms of infection  - Monitor lab/diagnostic results  - Monitor all insertion sites, i.e. indwelling lines, tubes, and drains  - Monitor endotracheal if appropriate and nasal secretions for changes in amount and color  - [place] appropriate cooling/warming therapies per order  - Administer medications as ordered  - Instruct and encourage patient and family to use good hand hygiene technique  - Identify and instruct in appropriate isolation precautions for identified infection/condition  Outcome: Progressing     Problem: SAFETY ADULT  Goal: Patient will remain free of falls  Description: INTERVENTIONS:  - Educate patient/family on patient safety including physical limitations  - Instruct patient to call for assistance with activity   - Consult OT/PT to assist with strengthening/mobility   - Keep Call bell within reach  - Keep bed low and locked with side rails adjusted as appropriate  - Keep care items and personal belongings within reach  - Initiate and maintain comfort rounds  - Make Fall Risk Sign visible to staff  - Offer Toileting every  Hours, in advance of need  - Initiate/Maintain alarm  - Obtain necessary fall risk management equipment:   - Apply yellow socks and  bracelet for high fall risk patients  - Consider moving patient to room near nurses station  Outcome: Progressing  Goal: Maintain or return to baseline ADL function  Description: INTERVENTIONS:  -  Assess patient's ability to carry out ADLs; assess patient's baseline for ADL function and identify physical deficits which impact ability to perform ADLs (bathing, care of mouth/teeth, toileting, grooming, dressing, etc.)  - Assess/evaluate cause of self-care deficits   - Assess range of motion  - Assess patient's mobility; develop plan if impaired  - Assess patient's need for assistive devices and provide as appropriate  - Encourage maximum independence but intervene and supervise when necessary  - Involve family in performance of ADLs  - Assess for home care needs following discharge   - Consider OT consult to assist with ADL evaluation and planning for discharge  - Provide patient education as appropriate  Outcome: Progressing  Goal: Maintains/Returns to pre admission functional level  Description: INTERVENTIONS:  - Perform AM-PAC 6 Click Basic Mobility/ Daily Activity assessment daily.  - Set and communicate daily mobility goal to care team and patient/family/caregiver.   - Collaborate with rehabilitation services on mobility goals if consulted  - Perform Range of Motion  times a day.  - Reposition patient every  hours.  - Dangle patient  times a day  - Stand patient  times a day  - Ambulate patient  times a day  - Out of bed to chair  times a day   - Out of bed for meals  times a day  - Out of bed for toileting  - Record patient progress and toleration of activity level   Outcome: Progressing     Problem: DISCHARGE PLANNING  Goal: Discharge to home or other facility with appropriate resources  Description: INTERVENTIONS:  - Identify barriers to discharge w/patient and caregiver  - Arrange for needed discharge resources and transportation as appropriate  - Identify discharge learning needs (meds, wound care, etc.)  -  Arrange for interpretive services to assist at discharge as needed  - Refer to Case Management Department for coordinating discharge planning if the patient needs post-hospital services based on physician/advanced practitioner order or complex needs related to functional status, cognitive ability, or social support system  Outcome: Progressing

## 2024-12-22 NOTE — H&P (VIEW-ONLY)
Consultation - Gastroenterology   Name: Cristina Pennington 34 y.o. female I MRN: [id number]  Unit/Bed#: [place] [address] -01 I Date of Admission: 12/21/2024   Date of Service: 12/22/2024 I Hospital Day: 1       Inpatient consult to gastroenterology     Date/Time  12/22/2024 7:05 AM     Performed by  Marleny Rendon DO   Authorized by  Citlalli Alatorre MD           Physician Requesting Evaluation: Citlalli Salazar *   Reason for Evaluation / Principal Problem: Chol    Assessment & Plan  Choledocholithiasis  Cristina Pennington is a34 yo female with PMH recent acute cholecystitis who presented to [facility] on 12/21 as transfer from [place] for GI evaluation for ERCP. Patient recently presented to [place] for RUQ abdominal pain and found to have acute cholecystitis with CBD dilation on imaging to 1.1cm. She underwent lap tamica on 12/19 with IOC noting multiple filling defects. Fluoro personally interpreted with 3 stones noted. Labs at time of presentation with AST 68, ALT 93, , Tbili 1.15 (increased when compared to records from [place]).  Plan for ERCP tomorrow 12/23  Please keep NPO at midnight  No need for antibiotics from GI perspective without evidence of cholangitis  Pain control per primary team  Hold AC/AP  Monitor CBC/LFT's  Calculus of gallbladder with acute cholecystitis and obstruction  S/p lap tamica 12/19.      History of Present Illness   HPI:  Cristina Pennington is a34 yo female with PMH recent acute cholecystitis who presented to [facility] on 12/21 as transfer from [place] for GI evaluation for ERCP. Patient recently presented to [place] for RUQ abdominal pain and found to have acute cholecystitis with CBD dilation on imaging to 1.1cm. She underwent lap tamica on 12/19 with IOC noting multiple filling defects. Case discussed with advanced endoscopy who recommend transfer for ERCP. Labs at time of presentation with AST 68, ALT 93, , Tbili 1.15 (increased when compared to records from  [place]).    Patient seen and evaluated at bedside. Currently only reports abdominal pain associated with incisions from prior surgery. LFT's this morning with AST 74, , , Tbili 0.98. WBC is WNL.     Review of Systems   Constitutional:  Negative for activity change, appetite change, chills, fatigue and fever.   HENT:  Negative for sore throat and trouble swallowing.    Respiratory:  Negative for cough and shortness of breath.    Cardiovascular:  Negative for chest pain and leg swelling.   Gastrointestinal:  Positive for abdominal pain. Negative for constipation, diarrhea, nausea and vomiting.   Musculoskeletal:  Negative for arthralgias and back pain.   Skin:  Negative for color change and rash.   Neurological:  Negative for dizziness, weakness, light-headedness and headaches.     I have reviewed the patient's PMH, PSH, Social History, Family History, Meds, and Allergies    Objective :  Temp:  [97.9 °F (36.6 °C)-98.1 °F (36.7 °C)] 98.1 °F (36.7 °C)  HR:  [74-76] 74  BP: (111-127)/(70-84) 127/84  Resp:  [20] 20  SpO2:  [97 %-98 %] 98 %    Physical Exam  Vitals and nursing note reviewed.   Constitutional:       General: She is not in acute distress.     Appearance: Normal appearance. She is not ill-appearing.   HENT:      Head: Normocephalic and atraumatic.      Nose: Nose normal.      Mouth/Throat:      Mouth: Mucous membranes are moist.   Eyes:      General: No scleral icterus.     Conjunctiva/sclera: Conjunctivae normal.   Cardiovascular:      Rate and Rhythm: Normal rate and regular rhythm.   Pulmonary:      Effort: Pulmonary effort is normal. No respiratory distress.   Abdominal:      General: Bowel sounds are normal. There is no distension.      Palpations: Abdomen is soft. There is no mass.      Tenderness: There is abdominal tenderness. There is no guarding or rebound.      Hernia: No hernia is present.   Musculoskeletal:         General: Normal range of motion.      Cervical back: Normal range  of motion.      Right lower leg: No edema.      Left lower leg: No edema.   Skin:     General: Skin is warm and dry.      Coloration: Skin is not jaundiced.      Findings: No bruising.   Neurological:      General: No focal deficit present.      Mental Status: She is alert and oriented to person, place, and time.      Motor: No weakness.   Psychiatric:         Mood and Affect: Mood normal.         Behavior: Behavior normal.           Lab Results: I have reviewed the following results:CBC/BMP:   .     12/22/24  0410   WBC 8.49   HGB 11.8   HCT 35.5      SODIUM 137   K 3.4*      CO2 26   BUN 10   CREATININE 0.77   GLUC 75   MG 1.9    , LFTs:   .     12/22/24  0410   AST 74*   *   ALB 4.1   TBILI 0.98   ALKPHOS 105*    , PTT/INR:No new results in last 24 hours.

## 2024-12-22 NOTE — PLAN OF CARE
Problem: PAIN - ADULT  Goal: Verbalizes/displays adequate comfort level or baseline comfort level  Description: Interventions:  - Encourage patient to monitor pain and request assistance  - Assess pain using appropriate pain scale  - Administer analgesics based on type and severity of pain and evaluate response  - Implement non-pharmacological measures as appropriate and evaluate response  - Consider cultural and social influences on pain and pain management  - Notify physician/advanced practitioner if interventions unsuccessful or patient reports new pain  Outcome: Progressing     Problem: INFECTION - ADULT  Goal: Absence or prevention of progression during hospitalization  Description: INTERVENTIONS:  - Assess and monitor for signs and symptoms of infection  - Monitor lab/diagnostic results  - Monitor all insertion sites, i.e. indwelling lines, tubes, and drains  - Monitor endotracheal if appropriate and nasal secretions for changes in amount and color  - [place] appropriate cooling/warming therapies per order  - Administer medications as ordered  - Instruct and encourage patient and family to use good hand hygiene technique  - Identify and instruct in appropriate isolation precautions for identified infection/condition  Outcome: Progressing     Problem: SAFETY ADULT  Goal: Patient will remain free of falls  Description: INTERVENTIONS:  - Educate patient/family on patient safety including physical limitations  - Instruct patient to call for assistance with activity   - Consult OT/PT to assist with strengthening/mobility   - Keep Call bell within reach  - Keep bed low and locked with side rails adjusted as appropriate  - Keep care items and personal belongings within reach  - Initiate and maintain comfort rounds  - Make Fall Risk Sign visible to staff  - Offer Toileting every 2 Hours, in advance of need  - Initiate/Maintain alarm  - Obtain necessary fall risk management equipment:   - Apply yellow socks and  bracelet for high fall risk patients  - Consider moving patient to room near nurses station  Outcome: Progressing  Goal: Maintain or return to baseline ADL function  Description: INTERVENTIONS:  -  Assess patient's ability to carry out ADLs; assess patient's baseline for ADL function and identify physical deficits which impact ability to perform ADLs (bathing, care of mouth/teeth, toileting, grooming, dressing, etc.)  - Assess/evaluate cause of self-care deficits   - Assess range of motion  - Assess patient's mobility; develop plan if impaired  - Assess patient's need for assistive devices and provide as appropriate  - Encourage maximum independence but intervene and supervise when necessary  - Involve family in performance of ADLs  - Assess for home care needs following discharge   - Consider OT consult to assist with ADL evaluation and planning for discharge  - Provide patient education as appropriate  Outcome: Progressing  Goal: Maintains/Returns to pre admission functional level  Description: INTERVENTIONS:  - Perform AM-PAC 6 Click Basic Mobility/ Daily Activity assessment daily.  - Set and communicate daily mobility goal to care team and patient/family/caregiver.   - Collaborate with rehabilitation services on mobility goals if consulted  - Perform Range of Motion 4 times a day.  - Reposition patient every 2 hours.  - Dangle patient 3 times a day  - Stand patient 3 times a day  - Ambulate patient 3 times a day  - Out of bed to chair 3 times a day   - Out of bed for meals 3 times a day  - Out of bed for toileting  - Record patient progress and toleration of activity level   Outcome: Progressing     Problem: DISCHARGE PLANNING  Goal: Discharge to home or other facility with appropriate resources  Description: INTERVENTIONS:  - Identify barriers to discharge w/patient and caregiver  - Arrange for needed discharge resources and transportation as appropriate  - Identify discharge learning needs (meds, wound care,  etc.)  - Arrange for interpretive services to assist at discharge as needed  - Refer to Case Management Department for coordinating discharge planning if the patient needs post-hospital services based on physician/advanced practitioner order or complex needs related to functional status, cognitive ability, or social support system  Outcome: Progressing

## 2024-12-22 NOTE — CONSULTS
Consultation - Gastroenterology   Name: Cristina Pennington 34 y.o. female I MRN: [id number]  Unit/Bed#: [place] [address] -01 I Date of Admission: 12/21/2024   Date of Service: 12/22/2024 I Hospital Day: 1       Inpatient consult to gastroenterology     Date/Time  12/22/2024 7:05 AM     Performed by  Marleny Rendon DO   Authorized by  Citlalli Alatorre MD           Physician Requesting Evaluation: Citlalli Salazar *   Reason for Evaluation / Principal Problem: Chol    Assessment & Plan  Choledocholithiasis  Cristina Pennington is a34 yo female with PMH recent acute cholecystitis who presented to [facility] on 12/21 as transfer from [place] for GI evaluation for ERCP. Patient recently presented to [place] for RUQ abdominal pain and found to have acute cholecystitis with CBD dilation on imaging to 1.1cm. She underwent lap tamica on 12/19 with IOC noting multiple filling defects. Fluoro personally interpreted with 3 stones noted. Labs at time of presentation with AST 68, ALT 93, , Tbili 1.15 (increased when compared to records from [place]).  Plan for ERCP tomorrow 12/23  Please keep NPO at midnight  No need for antibiotics from GI perspective without evidence of cholangitis  Pain control per primary team  Hold AC/AP  Monitor CBC/LFT's  Calculus of gallbladder with acute cholecystitis and obstruction  S/p lap tamica 12/19.      History of Present Illness   HPI:  Cristina Pennington is a34 yo female with PMH recent acute cholecystitis who presented to [facility] on 12/21 as transfer from [place] for GI evaluation for ERCP. Patient recently presented to [place] for RUQ abdominal pain and found to have acute cholecystitis with CBD dilation on imaging to 1.1cm. She underwent lap tamica on 12/19 with IOC noting multiple filling defects. Case discussed with advanced endoscopy who recommend transfer for ERCP. Labs at time of presentation with AST 68, ALT 93, , Tbili 1.15 (increased when compared to records from  [place]).    Patient seen and evaluated at bedside. Currently only reports abdominal pain associated with incisions from prior surgery. LFT's this morning with AST 74, , , Tbili 0.98. WBC is WNL.     Review of Systems   Constitutional:  Negative for activity change, appetite change, chills, fatigue and fever.   HENT:  Negative for sore throat and trouble swallowing.    Respiratory:  Negative for cough and shortness of breath.    Cardiovascular:  Negative for chest pain and leg swelling.   Gastrointestinal:  Positive for abdominal pain. Negative for constipation, diarrhea, nausea and vomiting.   Musculoskeletal:  Negative for arthralgias and back pain.   Skin:  Negative for color change and rash.   Neurological:  Negative for dizziness, weakness, light-headedness and headaches.     I have reviewed the patient's PMH, PSH, Social History, Family History, Meds, and Allergies    Objective :  Temp:  [97.9 °F (36.6 °C)-98.1 °F (36.7 °C)] 98.1 °F (36.7 °C)  HR:  [74-76] 74  BP: (111-127)/(70-84) 127/84  Resp:  [20] 20  SpO2:  [97 %-98 %] 98 %    Physical Exam  Vitals and nursing note reviewed.   Constitutional:       General: She is not in acute distress.     Appearance: Normal appearance. She is not ill-appearing.   HENT:      Head: Normocephalic and atraumatic.      Nose: Nose normal.      Mouth/Throat:      Mouth: Mucous membranes are moist.   Eyes:      General: No scleral icterus.     Conjunctiva/sclera: Conjunctivae normal.   Cardiovascular:      Rate and Rhythm: Normal rate and regular rhythm.   Pulmonary:      Effort: Pulmonary effort is normal. No respiratory distress.   Abdominal:      General: Bowel sounds are normal. There is no distension.      Palpations: Abdomen is soft. There is no mass.      Tenderness: There is abdominal tenderness. There is no guarding or rebound.      Hernia: No hernia is present.   Musculoskeletal:         General: Normal range of motion.      Cervical back: Normal range  of motion.      Right lower leg: No edema.      Left lower leg: No edema.   Skin:     General: Skin is warm and dry.      Coloration: Skin is not jaundiced.      Findings: No bruising.   Neurological:      General: No focal deficit present.      Mental Status: She is alert and oriented to person, place, and time.      Motor: No weakness.   Psychiatric:         Mood and Affect: Mood normal.         Behavior: Behavior normal.           Lab Results: I have reviewed the following results:CBC/BMP:   .     12/22/24  0410   WBC 8.49   HGB 11.8   HCT 35.5      SODIUM 137   K 3.4*      CO2 26   BUN 10   CREATININE 0.77   GLUC 75   MG 1.9    , LFTs:   .     12/22/24  0410   AST 74*   *   ALB 4.1   TBILI 0.98   ALKPHOS 105*    , PTT/INR:No new results in last 24 hours.

## 2024-12-22 NOTE — ASSESSMENT & PLAN NOTE
CBD dilatation on intraoperative cholangiogram performed at [facility] following  ERCP planned for 12/23

## 2024-12-23 ENCOUNTER — ANESTHESIA EVENT (INPATIENT)
Dept: GASTROENTEROLOGY | Facility: HOSPITAL | Age: 34
End: 2024-12-23
Payer: COMMERCIAL

## 2024-12-23 ENCOUNTER — APPOINTMENT (INPATIENT)
Dept: GASTROENTEROLOGY | Facility: HOSPITAL | Age: 34
End: 2024-12-23
Payer: COMMERCIAL

## 2024-12-23 ENCOUNTER — ANESTHESIA (INPATIENT)
Dept: GASTROENTEROLOGY | Facility: HOSPITAL | Age: 34
End: 2024-12-23
Payer: COMMERCIAL

## 2024-12-23 ENCOUNTER — APPOINTMENT (INPATIENT)
Dept: RADIOLOGY | Facility: HOSPITAL | Age: 34
End: 2024-12-23
Payer: COMMERCIAL

## 2024-12-23 VITALS
HEIGHT: 69 IN | TEMPERATURE: 98.5 F | WEIGHT: 246.91 LBS | HEART RATE: 77 BPM | BODY MASS INDEX: 36.57 KG/M2 | OXYGEN SATURATION: 97 % | RESPIRATION RATE: 16 BRPM | SYSTOLIC BLOOD PRESSURE: 118 MMHG | DIASTOLIC BLOOD PRESSURE: 70 MMHG

## 2024-12-23 PROBLEM — E66.9 OBESITY (BMI 35.0-39.9 WITHOUT COMORBIDITY): Status: ACTIVE | Noted: 2024-12-23

## 2024-12-23 LAB
ALBUMIN SERPL BCG-MCNC: 4.3 G/DL (ref 3.5–5)
ALP SERPL-CCNC: 109 U/L (ref 34–104)
ALT SERPL W P-5'-P-CCNC: 84 U/L (ref 7–52)
ANION GAP SERPL CALCULATED.3IONS-SCNC: 8 MMOL/L (ref 4–13)
AST SERPL W P-5'-P-CCNC: 37 U/L (ref 13–39)
BASOPHILS # BLD AUTO: 0.03 THOUSANDS/ÂΜL (ref 0–0.1)
BASOPHILS NFR BLD AUTO: 0 % (ref 0–1)
BILIRUB DIRECT SERPL-MCNC: 0.15 MG/DL (ref 0–0.2)
BILIRUB SERPL-MCNC: 0.64 MG/DL (ref 0.2–1)
BUN SERPL-MCNC: 10 MG/DL (ref 5–25)
CALCIUM SERPL-MCNC: 9.6 MG/DL (ref 8.4–10.2)
CHLORIDE SERPL-SCNC: 103 MMOL/L (ref 96–108)
CO2 SERPL-SCNC: 26 MMOL/L (ref 21–32)
CREAT SERPL-MCNC: 0.75 MG/DL (ref 0.6–1.3)
EOSINOPHIL # BLD AUTO: 0.18 THOUSAND/ÂΜL (ref 0–0.61)
EOSINOPHIL NFR BLD AUTO: 2 % (ref 0–6)
ERYTHROCYTE [DISTWIDTH] IN BLOOD BY AUTOMATED COUNT: 12.2 % (ref 11.6–15.1)
EXT PREGNANCY TEST URINE: NEGATIVE
EXT. CONTROL: NORMAL
GFR SERPL CREATININE-BSD FRML MDRD: 103 ML/MIN/1.73SQ M
GLUCOSE SERPL-MCNC: 91 MG/DL (ref 65–140)
HCT VFR BLD AUTO: 36.2 % (ref 34.8–46.1)
HGB BLD-MCNC: 12.3 G/DL (ref 11.5–15.4)
IMM GRANULOCYTES # BLD AUTO: 0.05 THOUSAND/UL (ref 0–0.2)
IMM GRANULOCYTES NFR BLD AUTO: 1 % (ref 0–2)
LYMPHOCYTES # BLD AUTO: 3.28 THOUSANDS/ÂΜL (ref 0.6–4.47)
LYMPHOCYTES NFR BLD AUTO: 35 % (ref 14–44)
MCH RBC QN AUTO: 28.5 PG (ref 26.8–34.3)
MCHC RBC AUTO-ENTMCNC: 34 G/DL (ref 31.4–37.4)
MCV RBC AUTO: 84 FL (ref 82–98)
MONOCYTES # BLD AUTO: 0.59 THOUSAND/ÂΜL (ref 0.17–1.22)
MONOCYTES NFR BLD AUTO: 6 % (ref 4–12)
NEUTROPHILS # BLD AUTO: 5.17 THOUSANDS/ÂΜL (ref 1.85–7.62)
NEUTS SEG NFR BLD AUTO: 56 % (ref 43–75)
NRBC BLD AUTO-RTO: 0 /100 WBCS
PLATELET # BLD AUTO: 191 THOUSANDS/UL (ref 149–390)
PMV BLD AUTO: 10.4 FL (ref 8.9–12.7)
POTASSIUM SERPL-SCNC: 3.7 MMOL/L (ref 3.5–5.3)
PROT SERPL-MCNC: 7.5 G/DL (ref 6.4–8.4)
RBC # BLD AUTO: 4.32 MILLION/UL (ref 3.81–5.12)
SODIUM SERPL-SCNC: 137 MMOL/L (ref 135–147)
WBC # BLD AUTO: 9.3 THOUSAND/UL (ref 4.31–10.16)

## 2024-12-23 PROCEDURE — 43262 ENDO CHOLANGIOPANCREATOGRAPH: CPT | Performed by: INTERNAL MEDICINE

## 2024-12-23 PROCEDURE — 99239 HOSP IP/OBS DSCHRG MGMT >30: CPT | Performed by: FAMILY MEDICINE

## 2024-12-23 PROCEDURE — 0FC98ZZ EXTIRPATION OF MATTER FROM COMMON BILE DUCT, VIA NATURAL OR ARTIFICIAL OPENING ENDOSCOPIC: ICD-10-PCS | Performed by: INTERNAL MEDICINE

## 2024-12-23 PROCEDURE — 81025 URINE PREGNANCY TEST: CPT | Performed by: ANESTHESIOLOGY

## 2024-12-23 PROCEDURE — 85025 COMPLETE CBC W/AUTO DIFF WBC: CPT | Performed by: STUDENT IN AN ORGANIZED HEALTH CARE EDUCATION/TRAINING PROGRAM

## 2024-12-23 PROCEDURE — 43264 ERCP REMOVE DUCT CALCULI: CPT | Performed by: INTERNAL MEDICINE

## 2024-12-23 PROCEDURE — 82248 BILIRUBIN DIRECT: CPT | Performed by: STUDENT IN AN ORGANIZED HEALTH CARE EDUCATION/TRAINING PROGRAM

## 2024-12-23 PROCEDURE — C1769 GUIDE WIRE: HCPCS

## 2024-12-23 PROCEDURE — 74328 X-RAY BILE DUCT ENDOSCOPY: CPT

## 2024-12-23 PROCEDURE — 80053 COMPREHEN METABOLIC PANEL: CPT | Performed by: STUDENT IN AN ORGANIZED HEALTH CARE EDUCATION/TRAINING PROGRAM

## 2024-12-23 RX ORDER — LIDOCAINE HYDROCHLORIDE 20 MG/ML
INJECTION, SOLUTION EPIDURAL; INFILTRATION; INTRACAUDAL; PERINEURAL AS NEEDED
Status: DISCONTINUED | OUTPATIENT
Start: 2024-12-23 | End: 2024-12-23

## 2024-12-23 RX ORDER — PROMETHAZINE HYDROCHLORIDE 25 MG/ML
6.25 INJECTION, SOLUTION INTRAMUSCULAR; INTRAVENOUS ONCE AS NEEDED
Status: DISCONTINUED | OUTPATIENT
Start: 2024-12-23 | End: 2024-12-23 | Stop reason: HOSPADM

## 2024-12-23 RX ORDER — SODIUM CHLORIDE, SODIUM LACTATE, POTASSIUM CHLORIDE, CALCIUM CHLORIDE 600; 310; 30; 20 MG/100ML; MG/100ML; MG/100ML; MG/100ML
125 INJECTION, SOLUTION INTRAVENOUS CONTINUOUS
Status: DISCONTINUED | OUTPATIENT
Start: 2024-12-23 | End: 2024-12-23 | Stop reason: HOSPADM

## 2024-12-23 RX ORDER — ONDANSETRON 2 MG/ML
4 INJECTION INTRAMUSCULAR; INTRAVENOUS ONCE AS NEEDED
Status: DISCONTINUED | OUTPATIENT
Start: 2024-12-23 | End: 2024-12-23 | Stop reason: HOSPADM

## 2024-12-23 RX ORDER — DEXAMETHASONE SODIUM PHOSPHATE 10 MG/ML
INJECTION, SOLUTION INTRAMUSCULAR; INTRAVENOUS AS NEEDED
Status: DISCONTINUED | OUTPATIENT
Start: 2024-12-23 | End: 2024-12-23

## 2024-12-23 RX ORDER — FENTANYL CITRATE/PF 50 MCG/ML
25 SYRINGE (ML) INJECTION
Status: DISCONTINUED | OUTPATIENT
Start: 2024-12-23 | End: 2024-12-23 | Stop reason: HOSPADM

## 2024-12-23 RX ORDER — PROPOFOL 10 MG/ML
INJECTION, EMULSION INTRAVENOUS AS NEEDED
Status: DISCONTINUED | OUTPATIENT
Start: 2024-12-23 | End: 2024-12-23

## 2024-12-23 RX ORDER — INDOMETHACIN 50 MG/1
SUPPOSITORY RECTAL AS NEEDED
Status: DISCONTINUED | OUTPATIENT
Start: 2024-12-23 | End: 2024-12-23 | Stop reason: HOSPADM

## 2024-12-23 RX ORDER — FENTANYL CITRATE 50 UG/ML
INJECTION, SOLUTION INTRAMUSCULAR; INTRAVENOUS AS NEEDED
Status: DISCONTINUED | OUTPATIENT
Start: 2024-12-23 | End: 2024-12-23

## 2024-12-23 RX ORDER — ROCURONIUM BROMIDE 10 MG/ML
INJECTION, SOLUTION INTRAVENOUS AS NEEDED
Status: DISCONTINUED | OUTPATIENT
Start: 2024-12-23 | End: 2024-12-23

## 2024-12-23 RX ADMIN — INDOMETHACIN 100 MG: 50 SUPPOSITORY RECTAL at 12:32

## 2024-12-23 RX ADMIN — PROPOFOL 200 MG: 10 INJECTION, EMULSION INTRAVENOUS at 12:14

## 2024-12-23 RX ADMIN — IOHEXOL 26 ML: 300 INJECTION, SOLUTION INTRAVENOUS at 12:45

## 2024-12-23 RX ADMIN — ONDANSETRON 4 MG: 2 INJECTION INTRAMUSCULAR; INTRAVENOUS at 12:15

## 2024-12-23 RX ADMIN — FENTANYL CITRATE 50 MCG: 50 INJECTION INTRAMUSCULAR; INTRAVENOUS at 12:14

## 2024-12-23 RX ADMIN — ROCURONIUM 40 MG: 50 INJECTION, SOLUTION INTRAVENOUS at 12:15

## 2024-12-23 RX ADMIN — SODIUM CHLORIDE, SODIUM LACTATE, POTASSIUM CHLORIDE, AND CALCIUM CHLORIDE 125 ML/HR: .6; .31; .03; .02 INJECTION, SOLUTION INTRAVENOUS at 11:18

## 2024-12-23 RX ADMIN — LIDOCAINE HYDROCHLORIDE 80 MG: 20 INJECTION, SOLUTION EPIDURAL; INFILTRATION; INTRACAUDAL at 12:14

## 2024-12-23 RX ADMIN — SUGAMMADEX 250 MG: 100 INJECTION, SOLUTION INTRAVENOUS at 12:55

## 2024-12-23 RX ADMIN — FENTANYL CITRATE 50 MCG: 50 INJECTION INTRAMUSCULAR; INTRAVENOUS at 12:52

## 2024-12-23 RX ADMIN — DEXAMETHASONE SODIUM PHOSPHATE 10 MG: 10 INJECTION INTRAMUSCULAR; INTRAVENOUS at 12:15

## 2024-12-23 NOTE — UTILIZATION REVIEW
Initial Clinical Review    Admission: Date/Time/Statement:   Admission Orders (From admission, onward)       Ordered        12/21/24 1621  INPATIENT ADMISSION  Once                          Orders Placed This Encounter   Procedures    INPATIENT ADMISSION     Standing Status:   Standing     Number of Occurrences:   1     Level of Care:   Med Surg [16]     Estimated length of stay:   More than 2 Midnights     Certification:   I certify that inpatient services are medically necessary for this patient for a duration of greater than two midnights. See H&P and MD Progress Notes for additional information about the patient's course of treatment.            No chief complaint on file.      Initial Presentation: 34 y.o. female  presented to [facility] where she presented with RUQ pain and found to have acute cholecystitis with CBD dilatation to 1.1 cm. She underwent laparoscopic cholecystectomy with cholangiogram identifying stones in the CBD. No signs of cholangitis. She was transferred to [facility] for ERCP.  Plan Leukocytosis improved: 11.6 to 10.3, patient afebrile Will continue Zosyn for now Continue daily monitoring of LFTs, CBC Monitor fever curve  Multimodal pain management and zofran prn for nausea consult GI and supportive care     Anticipated Length of Stay/Certification Statement:  Patient will be admitted on an inpatient basis with an anticipated length of stay of greater than 2 midnights secondary to acute cholecystitis.       Date: 12-22-24   Day 2: Slight pain over laparoscopic incision site. On exam Some tenderness close to incision sites continue IVF IV Zosyn D/c NPO @ [facility] for ERCP  Lab trend from [facility] (12/20 to 12/21), slight bump in LFTs 12/22 am  D. Bili 0.3 > 0.4  AST 52 > 51 > 68   ALT 67 > 74 > 93  Alk phos 118 > 115 > 115    GI consult:   Choledocholithiasis  Cristina Pennington is a34 yo female with PMH recent acute cholecystitis who presented to [facility] on 12/21 as transfer from [facility] for GI evaluation for ERCP. Patient recently presented to [place] for RUQ abdominal pain and found to have acute cholecystitis with CBD dilation on imaging to 1.1cm. She underwent lap tamica on 12/19 with IOC noting multiple filling defects. Fluoro personally interpreted with 3 stones noted. Labs at time of presentation with AST 68, ALT 93, , Tbili 1.15 (increased when compared to records from [place]).  Plan for ERCP tomorrow 12/23  Please keep NPO at midnight  No need for antibiotics from GI perspective without evidence of cholangitis  Pain control per primary team  Hold AC/AP  Monitor CBC/LFT's  Calculus of gallbladder with acute cholecystitis and obstruction  S/p lap tamica 12/19    Certification Statement: The patient will continue to require additional inpatient hospital stay due to choledocholithiasis, anticipate ERCP on 12/23       Scheduled Medications:  melatonin, 9 mg, Oral, HS  polyethylene glycol, 17 g, Oral, Daily  IV piperacillin-tazobactam (ZOSYN) 4.5 g in sodium chloride 0.9 % 100 mL IVPB (EXTENDED INFUSION)     Continuous IV Infusions:  lactated ringers, 125 mL/hr, Intravenous, Continuous      PRN Meds:  acetaminophen, 650 mg, Oral, Q6H PRN  diphenhydrAMINE, 50 mg, Oral, HS PRN  fentaNYL, 25 mcg, Intravenous, Q5 Min PRN  ondansetron, 4 mg, Intravenous, Q6H PRN  X 1  ondansetron, 4 mg, Intravenous, Once PRN  promethazine, 6.25 mg, Intravenous, Once PRN      ED Triage Vitals   Temperature Pulse Respirations Blood Pressure SpO2 Pain Score   12/21/24 1621 12/21/24 1621 12/21/24 2111 12/21/24 1621 12/21/24 1621 12/21/24 2031   97.9 °F (36.6 °C) 76 20 111/70 97 % 1     Weight (last 2 days)       Date/Time Weight    12/23/24 1107 112 (246.92)    12/21/24 1844 112 (246.7)            Vital Signs (last 3 days)       Date/Time Temp Pulse Resp BP MAP (mmHg) SpO2 O2 Device Pain    12/23/24 1331 -- 77 16 118/70 -- 97 % None (Room air) No Pain    12/23/24 1323 -- 72 18 111/59 -- 100 % None (Room  air) No Pain    12/23/24 1310 98.5 °F (36.9 °C) -- -- -- -- -- -- --    12/23/24 1308 -- 82 15 120/88 -- 100 % None (Room air) --    12/23/24 1107 98 °F (36.7 °C) 66 16 127/76 -- 96 % None (Room air) No Pain    12/23/24 0900 -- -- -- -- -- -- -- No Pain    12/23/24 07:27:12 98.5 °F (36.9 °C) 66 16 128/81 97 98 % -- --    12/22/24 21:09:52 98.9 °F (37.2 °C) 75 18 122/64 83 97 % -- --    12/22/24 21:08:04 98.9 °F (37.2 °C) 74 -- 98/47 64 97 % -- --    12/22/24 1945 -- -- -- -- -- -- None (Room air) No Pain    12/22/24 15:17:33 98.4 °F (36.9 °C) 75 20 142/90 107 97 % -- --    12/22/24 0820 -- -- -- -- -- -- -- 1    12/22/24 07:20:53 98.3 °F (36.8 °C) 66 18 124/76 92 93 % -- --    12/21/24 2309 -- -- -- -- -- -- -- 6    12/21/24 21:11:21 98.1 °F (36.7 °C) 74 20 127/84 98 98 % -- --    12/21/24 2031 -- -- -- -- -- -- -- 1    12/21/24 16:21:37 97.9 °F (36.6 °C) 76 -- 111/70 84 97 % -- --              Pertinent Labs/Diagnostic Test Results:   Radiology:  FL ERCP biliary only    (Results Pending)     Cardiology:  No orders to display     GI:  ERCP   Final Result by Sadie Chavez MD (12/23 1312)   The common bile duct was deeply cannulated after 3 attempts by employing a    double guidewire technique. Cannulation was difficult due to small    ampulla.   Complete major papilla sphincterotomy was performed.   Contrast injection with occlusion was performed successfully.   Multiple small floating filling defects consistent with stones were    visualized in the distal common bile duct   Multiple sweeps were performed in the proximal common bile duct. Stones    were removed.   Indomethacin 100mg UT was administered for prophylaxis of post-ERCP    pancreatitis.         RECOMMENDATION:   Follow up with PCP    Clear liquid diet first.  If tolerated without pain, then advance diet as    tolerated.    Follow-up with general surgery.                         Sadie Chavez MD                  Results from last 7 days   Lab Units  "12/23/24 0436 12/22/24 0410 12/21/24  1747   WBC Thousand/uL 9.30 8.49 9.41   HEMOGLOBIN g/dL 12.3 11.8 12.8   HEMATOCRIT % 36.2 35.5 38.1   PLATELETS Thousands/uL 191 185 175   TOTAL NEUT ABS Thousands/µL 5.17 4.34 5.72         Results from last 7 days   Lab Units 12/23/24 0436 12/22/24 0410 12/21/24  1747   SODIUM mmol/L 137 137 138   POTASSIUM mmol/L 3.7 3.4* 3.3*   CHLORIDE mmol/L 103 104 102   CO2 mmol/L 26 26 27   ANION GAP mmol/L 8 7 9   BUN mg/dL 10 10 8   CREATININE mg/dL 0.75 0.77 0.76   EGFR ml/min/1.73sq m 103 100 101   CALCIUM mg/dL 9.6 9.1 9.3   MAGNESIUM mg/dL  --  1.9  --      Results from last 7 days   Lab Units 12/23/24 0436 12/22/24 0410 12/21/24  1747   AST U/L 37 74* 68*   ALT U/L 84* 102* 93*   ALK PHOS U/L 109* 105* 115*   TOTAL PROTEIN g/dL 7.5 7.3 8.2   ALBUMIN g/dL 4.3 4.1 4.6   TOTAL BILIRUBIN mg/dL 0.64 0.98 1.15*   BILIRUBIN DIRECT mg/dL 0.15  --  0.41*         Results from last 7 days   Lab Units 12/23/24 0436 12/22/24 0410 12/21/24  1747   GLUCOSE RANDOM mg/dL 91 75 83             No results found for: \"BETA-HYDROXYBUTYRATE\"                                                                                                                                         Past Medical History:   Diagnosis Date    Anemia      Present on Admission:  **None**      Admitting Diagnosis: Gallbladder & bile duct stone with obstruction [K80.71]  Age/Sex: 34 y.o. female    Network Utilization Review Department  ATTENTION: Please call with any questions or concerns to [phone] and carefully listen to the prompts so that you are directed to the right person. All voicemails are confidential.   For Discharge needs, contact Care Management DC Support Team at [phone] opt. [phone]  Send all requests for admission clinical reviews, approved or denied determinations and any other requests to dedicated fax number below belonging to the campus where the patient is receiving treatment. List of dedicated fax " numbers for the Facilities:  FACILITY NAME UR FAX NUMBER   ADMISSION DENIALS (Administrative/Medical Necessity) [phone]   DISCHARGE SUPPORT TEAM (NETWORK) [phone]   PARENT CHILD HEALTH (Maternity/NICU/Pediatrics) [phone]   [facility] [phone]   [facility] [phone]   [facility] [phone]   [facility] [phone]   Critical access hospital [phone]   [facility] [phone]   [facility] [phone]   [facility] [phone]   [facility] [phone]   Critical access hospital [phone]   [facility] [phone]   [facility] [phone]

## 2024-12-23 NOTE — INTERVAL H&P NOTE
H&P reviewed. After examining the patient I find no changes in the patients condition since the H&P had been written.    Vitals:    12/23/24 1107   BP: 127/76   Pulse: 66   Resp: 16   Temp: 98 °F (36.7 °C)   SpO2: 96%

## 2024-12-23 NOTE — PLAN OF CARE
Problem: PAIN - ADULT  Goal: Verbalizes/displays adequate comfort level or baseline comfort level  Description: Interventions:  - Encourage patient to monitor pain and request assistance  - Assess pain using appropriate pain scale  - Administer analgesics based on type and severity of pain and evaluate response  - Implement non-pharmacological measures as appropriate and evaluate response  - Consider cultural and social influences on pain and pain management  - Notify physician/advanced practitioner if interventions unsuccessful or patient reports new pain  Outcome: Progressing     Problem: INFECTION - ADULT  Goal: Absence or prevention of progression during hospitalization  Description: INTERVENTIONS:  - Assess and monitor for signs and symptoms of infection  - Monitor lab/diagnostic results  - Monitor all insertion sites, i.e. indwelling lines, tubes, and drains  - Monitor endotracheal if appropriate and nasal secretions for changes in amount and color  - [place] appropriate cooling/warming therapies per order  - Administer medications as ordered  - Instruct and encourage patient and family to use good hand hygiene technique  - Identify and instruct in appropriate isolation precautions for identified infection/condition  Outcome: Progressing     Problem: SAFETY ADULT  Goal: Patient will remain free of falls  Description: INTERVENTIONS:  - Educate patient/family on patient safety including physical limitations  - Instruct patient to call for assistance with activity   - Consult OT/PT to assist with strengthening/mobility   - Keep Call bell within reach  - Keep bed low and locked with side rails adjusted as appropriate  - Keep care items and personal belongings within reach  - Initiate and maintain comfort rounds  - Make Fall Risk Sign visible to staff  - Offer Toileting every 2 Hours, in advance of need  - Initiate/Maintain bed alarm  - Obtain necessary fall risk management equipment  - Apply yellow socks and  bracelet for high fall risk patients  - Consider moving patient to room near nurses station  Outcome: Progressing  Goal: Maintain or return to baseline ADL function  Description: INTERVENTIONS:  -  Assess patient's ability to carry out ADLs; assess patient's baseline for ADL function and identify physical deficits which impact ability to perform ADLs (bathing, care of mouth/teeth, toileting, grooming, dressing, etc.)  - Assess/evaluate cause of self-care deficits   - Assess range of motion  - Assess patient's mobility; develop plan if impaired  - Assess patient's need for assistive devices and provide as appropriate  - Encourage maximum independence but intervene and supervise when necessary  - Involve family in performance of ADLs  - Assess for home care needs following discharge   - Consider OT consult to assist with ADL evaluation and planning for discharge  - Provide patient education as appropriate  Outcome: Progressing  Goal: Maintains/Returns to pre admission functional level  Description: INTERVENTIONS:  - Perform AM-PAC 6 Click Basic Mobility/ Daily Activity assessment daily.  - Set and communicate daily mobility goal to care team and patient/family/caregiver.   - Collaborate with rehabilitation services on mobility goals if consulted  - Perform Range of Motion 3 times a day.  - Reposition patient every 2 hours.  - Dangle patient 3 times a day  - Stand patient 3 times a day  - Ambulate patient 3 times a day  - Out of bed to chair 3 times a day   - Out of bed for meals 3 times a day  - Out of bed for toileting  - Record patient progress and toleration of activity level   Outcome: Progressing     Problem: DISCHARGE PLANNING  Goal: Discharge to home or other facility with appropriate resources  Description: INTERVENTIONS:  - Identify barriers to discharge w/patient and caregiver  - Arrange for needed discharge resources and transportation as appropriate  - Identify discharge learning needs (meds, wound care,  etc.)  - Arrange for interpretive services to assist at discharge as needed  - Refer to Case Management Department for coordinating discharge planning if the patient needs post-hospital services based on physician/advanced practitioner order or complex needs related to functional status, cognitive ability, or social support system  Outcome: Progressing

## 2024-12-23 NOTE — ANESTHESIA PREPROCEDURE EVALUATION
Procedure:  ERCP    Relevant Problems   HEMATOLOGY   (+) Anemia affecting pregnancy        Physical Exam    Airway    Mallampati score: III  TM Distance: >3 FB  Neck ROM: full     Dental   No notable dental hx     Cardiovascular  Rhythm: regular, Rate: normal, Cardiovascular exam normal    Pulmonary  Pulmonary exam normal Breath sounds clear to auscultation    Other Findings  post-pubertal.      Anesthesia Plan  ASA Score- 2     Anesthesia Type- general with ASA Monitors.         Additional Monitors:     Airway Plan: ETT.    Comment: Pt unable to remove nose ring.  She is aware that there is the potential for injury from th e nose ring while under anesthesia..       Plan Factors-       Existing labs reviewed.                   Induction- intravenous.    Postoperative Plan-     Perioperative Resuscitation Plan - Level 1 - Full Code.       Informed Consent- Anesthetic plan and risks discussed with patient.

## 2024-12-23 NOTE — ANESTHESIA POSTPROCEDURE EVALUATION
Post-Op Assessment Note    CV Status:  Stable    Pain management: adequate       Mental Status:  Alert and awake   Hydration Status:  Euvolemic   PONV Controlled:  Controlled   Airway Patency:  Patent     Post Op Vitals Reviewed: Yes    No anethesia notable event occurred.    Staff: Anesthesiologist           Last Filed PACU Vitals:  Vitals Value Taken Time   Temp 98.5 °F (36.9 °C) 12/23/24 1310   Pulse 77 12/23/24 1331   /70 12/23/24 1331   Resp 16 12/23/24 1331   SpO2 97 % 12/23/24 1331       Modified [place]:  Activity: 2 (12/23/2024  1:31 PM)  Respiration: 2 (12/23/2024  1:31 PM)  Circulation: 2 (12/23/2024  1:31 PM)  Consciousness: 2 (12/23/2024  1:31 PM)  Oxygen Saturation: 2 (12/23/2024  1:31 PM)  Modified [place] Score: 10 (12/23/2024  1:31 PM)

## 2024-12-23 NOTE — ANESTHESIA POSTPROCEDURE EVALUATION
Post-Op Assessment Note    CV Status:  Stable  Pain Score: 0    Pain management: adequate       Mental Status:  Awake   Hydration Status:  Euvolemic   Airway Patency:  Patent  Airway: intubated     Post Op Vitals Reviewed: Yes    No anethesia notable event occurred.    Staff: Anesthesiologist, CRNA           Last Filed PACU Vitals:  Vitals Value Taken Time   Temp 98.5 °F (36.9 °C) 12/23/24 1310   Pulse 82 12/23/24 1308   /88 12/23/24 1308   Resp 15 12/23/24 1308   SpO2 100 % 12/23/24 1308       Modified [place]:  Activity: 1 (12/23/2024  1:09 PM)  Respiration: 2 (12/23/2024  1:09 PM)  Circulation: 1 (12/23/2024  1:09 PM)  Consciousness: 1 (12/23/2024  1:09 PM)  Oxygen Saturation: 2 (12/23/2024  1:09 PM)  Modified [place] Score: 7 (12/23/2024  1:09 PM)

## 2024-12-23 NOTE — PLAN OF CARE
Problem: PAIN - ADULT  Goal: Verbalizes/displays adequate comfort level or baseline comfort level  Description: Interventions:  - Encourage patient to monitor pain and request assistance  - Assess pain using appropriate pain scale  - Administer analgesics based on type and severity of pain and evaluate response  - Implement non-pharmacological measures as appropriate and evaluate response  - Consider cultural and social influences on pain and pain management  - Notify physician/advanced practitioner if interventions unsuccessful or patient reports new pain  Outcome: Progressing     Problem: INFECTION - ADULT  Goal: Absence or prevention of progression during hospitalization  Description: INTERVENTIONS:  - Assess and monitor for signs and symptoms of infection  - Monitor lab/diagnostic results  - Monitor all insertion sites, i.e. indwelling lines, tubes, and drains  - Monitor endotracheal if appropriate and nasal secretions for changes in amount and color  - [place] appropriate cooling/warming therapies per order  - Administer medications as ordered  - Instruct and encourage patient and family to use good hand hygiene technique  - Identify and instruct in appropriate isolation precautions for identified infection/condition  Outcome: Progressing     Problem: SAFETY ADULT  Goal: Patient will remain free of falls  Description: INTERVENTIONS:  - Educate patient/family on patient safety including physical limitations  - Instruct patient to call for assistance with activity   - Consult OT/PT to assist with strengthening/mobility   - Keep Call bell within reach  - Keep bed low and locked with side rails adjusted as appropriate  - Keep care items and personal belongings within reach  - Initiate and maintain comfort rounds  - Make Fall Risk Sign visible to staff  - Offer Toileting every 2 Hours, in advance of need  - Initiate/Maintain bed alarm  - Obtain necessary fall risk management equipment.  - Apply yellow socks and  bracelet for high fall risk patients  - Consider moving patient to room near nurses station  Outcome: Progressing  Goal: Maintain or return to baseline ADL function  Description: INTERVENTIONS:  -  Assess patient's ability to carry out ADLs; assess patient's baseline for ADL function and identify physical deficits which impact ability to perform ADLs (bathing, care of mouth/teeth, toileting, grooming, dressing, etc.)  - Assess/evaluate cause of self-care deficits   - Assess range of motion  - Assess patient's mobility; develop plan if impaired  - Assess patient's need for assistive devices and provide as appropriate  - Encourage maximum independence but intervene and supervise when necessary  - Involve family in performance of ADLs  - Assess for home care needs following discharge   - Consider OT consult to assist with ADL evaluation and planning for discharge  - Provide patient education as appropriate  Outcome: Progressing  Goal: Maintains/Returns to pre admission functional level  Description: INTERVENTIONS:  - Perform AM-PAC 6 Click Basic Mobility/ Daily Activity assessment daily.  - Set and communicate daily mobility goal to care team and patient/family/caregiver.   - Collaborate with rehabilitation services on mobility goals if consulted  - Perform Range of Motion 2 times a day.  - Reposition patient every 2 hours.  - Dangle patient 2 times a day  - Stand patient 2 times a day  - Ambulate patient 2 times a day  - Out of bed to chair 2 times a day   - Out of bed for meals 2 times a day  - Out of bed for toileting  - Record patient progress and toleration of activity level   Outcome: Progressing     Problem: DISCHARGE PLANNING  Goal: Discharge to home or other facility with appropriate resources  Description: INTERVENTIONS:  - Identify barriers to discharge w/patient and caregiver  - Arrange for needed discharge resources and transportation as appropriate  - Identify discharge learning needs (meds, wound care,  etc.)  - Arrange for interpretive services to assist at discharge as needed  - Refer to Case Management Department for coordinating discharge planning if the patient needs post-hospital services based on physician/advanced practitioner order or complex needs related to functional status, cognitive ability, or social support system  Outcome: Progressing

## 2024-12-24 NOTE — ASSESSMENT & PLAN NOTE
CBD dilatation on intraoperative cholangiogram performed at [facility] following  ERCP done on 12/23

## 2024-12-24 NOTE — UTILIZATION REVIEW
NOTIFICATION OF ADMISSION DISCHARGE   This is a Notification of Discharge from [facility]. Please be advised that this patient has been discharge from our facility. Below you will find the admission and discharge date and time including the patient’s disposition.   UTILIZATION REVIEW CONTACT:  Julieta Forrest  Utilization   Network Utilization Review Department  Phone: [phone] x carefully listen to the prompts. All voicemails are confidential.  Email: [email or web address]     ADMISSION INFORMATION  PRESENTATION DATE: 12/21/2024  4:09 PM  OBERVATION ADMISSION DATE: N/A  INPATIENT ADMISSION DATE: 12/21/24  4:09 PM   DISCHARGE DATE: 12/23/2024  6:43 PM   DISPOSITION:Home/Self Care    Network Utilization Review Department  ATTENTION: Please call with any questions or concerns to [phone] and carefully listen to the prompts so that you are directed to the right person. All voicemails are confidential.   For Discharge needs, contact Care Management DC Support Team at [phone] opt. 2  Send all requests for admission clinical reviews, approved or denied determinations and any other requests to dedicated fax number below belonging to the campus where the patient is receiving treatment. List of dedicated fax numbers for the Facilities:  FACILITY NAME UR FAX NUMBER   ADMISSION DENIALS (Administrative/Medical Necessity) [phone]   DISCHARGE SUPPORT TEAM ([facility]) [phone]   PARENT CHILD HEALTH (Maternity/NICU/Pediatrics) [phone]   [facility] [phone]   [facility] [phone]   [facility] [phone]   [facility] [phone]   [facility] [phone]   [facility] [phone]   [facility] [phone]   [facility] [phone]    [facility] [phone]   [facility] [phone]   [facility] [phone]   [facility] [phone]

## 2024-12-24 NOTE — UTILIZATION REVIEW
NOTIFICATION OF ADMISSION DISCHARGE   This is a Notification of Discharge from [facility]. Please be advised that this patient has been discharge from our facility. Below you will find the admission and discharge date and time including the patient’s disposition.   UTILIZATION REVIEW CONTACT:  Julieta Forrest  Utilization   Network Utilization Review Department  Phone: [phone] x carefully listen to the prompts. All voicemails are confidential.  Email: [email or web address]     ADMISSION INFORMATION  PRESENTATION DATE: 12/21/2024  4:09 PM  OBERVATION ADMISSION DATE: N/A  INPATIENT ADMISSION DATE: 12/21/24  4:09 PM   DISCHARGE DATE: 12/23/2024  6:43 PM   DISPOSITION:Home/Self Care    Network Utilization Review Department  ATTENTION: Please call with any questions or concerns to [phone] and carefully listen to the prompts so that you are directed to the right person. All voicemails are confidential.   For Discharge needs, contact Care Management DC Support Team at [phone] opt. 2  Send all requests for admission clinical reviews, approved or denied determinations and any other requests to dedicated fax number below belonging to the campus where the patient is receiving treatment. List of dedicated fax numbers for the Facilities:  FACILITY NAME UR FAX NUMBER   ADMISSION DENIALS (Administrative/Medical Necessity) [phone]   DISCHARGE SUPPORT TEAM ([facility]) [phone]   PARENT CHILD HEALTH (Maternity/NICU/Pediatrics) [phone]   [facility] [phone]   [facility] [phone]   [facility] [phone]   [facility] [phone]   [facility] [phone]   [facility] [phone]   [facility] [phone]   [facility] [phone]    [facility] [phone]   [facility] [phone]   [facility] [phone]   [facility] [phone]

## 2024-12-24 NOTE — ASSESSMENT & PLAN NOTE
Patient is a 34/F presenting to [facility] as a transfer from [facility] where she presented with RUQ pain and found to have acute cholecystitis with CBD dilatation to 1.1 cm. She underwent laparoscopic cholecystectomy with cholangiogram identifying stones in the CBD. No signs of cholangitis. She was transferred to [facility] for ERCP.   Lab trend from [facility] (12/20 to 12/21), slight bump in LFTs 12/22 am  D. Bili 0.3 > 0.4  AST 52 > 51 > 68   ALT 67 > 74 > 93  Alk phos 118 > 115 > 115  Leukocytosis improved from 11.6 when admitted to [facility]   Per GI, can d/c antibiotics given no evidence of cholangitis  S/p ERCP with the removal of stones on 12/23  Patient tolerated liquid diet and is doing well now  She will be discharged with a close follow up with General Surgery

## 2024-12-24 NOTE — DISCHARGE SUMMARY
Discharge Summary - Hospitalist   Name: Cristina Pennington 34 y.o. female I MRN: [id number]  Unit/Bed#: [place] [address] -01 I Date of Admission: 12/21/2024   Date of Service: 12/24/2024 I Hospital Day: 2     Assessment & Plan  Calculus of gallbladder with acute cholecystitis and obstruction  Patient is a 34/F presenting to [facility] as a transfer from [facility] where she presented with RUQ pain and found to have acute cholecystitis with CBD dilatation to 1.1 cm. She underwent laparoscopic cholecystectomy with cholangiogram identifying stones in the CBD. No signs of cholangitis. She was transferred to [facility] for ERCP.   Lab trend from [facility] (12/20 to 12/21), slight bump in LFTs 12/22 am  D. Bili 0.3 > 0.4  AST 52 > 51 > 68   ALT 67 > 74 > 93  Alk phos 118 > 115 > 115  Leukocytosis improved from 11.6 when admitted to [facility]   Per GI, can d/c antibiotics given no evidence of cholangitis  S/p ERCP with the removal of stones on 12/23  Patient tolerated liquid diet and is doing well now  She will be discharged with a close follow up with General Surgery  Hypokalemia  Continue to monitor and replete accordingly  Choledocholithiasis  CBD dilatation on intraoperative cholangiogram performed at [facility] following  ERCP done on 12/23  Obesity (BMI 35.0-39.9 without comorbidity)       Medical Problems       Resolved Problems  Date Reviewed: 6/29/2021   None       Discharging Physician / Practitioner: Brittany Winter MD  PCP: Love Ceballos MD  Admission Date:   Admission Orders (From admission, onward)       Ordered        12/21/24 1621  INPATIENT ADMISSION  Once                          Discharge Date: 12/23/24    Consultations During Hospital Stay:  GI    Procedures Performed:   ERCP    Significant Findings / Test Results:   ERCP: Multiple round filling defects consistent with choledocholithiasis are seen within the distal common bile duct. No persistent filling defects are evident on  "final image obtained after multiple balloon sweeps.     Incidental Findings:    K: 3.3    Test Results Pending at Discharge (will require follow up):   None     Outpatient Tests Requested:  None    Complications:  None    Reason for Admission: RUQ pain    Hospital Course:   Cristina Pennington is a 34 y.o. female patient who originally presented to the hospital on 12/21/2024 due to abdominal pain.   She has a [facility] recent acute cholecystitis who presented to [facility] on 12/21 as transfer from [place] for GI evaluation for ERCP. Patient recently presented to [place] for RUQ abdominal pain and found to have acute cholecystitis with CBD dilation on imaging to 1.1cm. She underwent lap tamica on 12/19 with IOC noting multiple filling defects. Fluoro personally interpreted with 3 stones noted. Labs at time of presentation with AST 68, ALT 93, , Tbili 1.15 (increased when compared to records from [place]).     On 12/23, she had an ERCP done and, as a results, multiple stones have removed from the bile duct. She tolerated the procedure well and was able to eat liquid diet without any problems at the time of discharge. She will follow up with General Surgery.    Please see above list of diagnoses and related plan for additional information.     Condition at Discharge: stable    Discharge Day Visit / Exam:   Subjective:  Cristina was seen and examined. She is doing ok after ERCP. She denies any abdominal pian or nausea and wants to try fluid diet.  Vitals: Blood Pressure: 118/70 (12/23/24 1331)  Pulse: 77 (12/23/24 1331)  Temperature: 98.5 °F (36.9 °C) (12/23/24 1310)  Temp Source: Temporal (12/23/24 1310)  Respirations: 16 (12/23/24 1331)  Height: 5' 9\" (175.3 cm) (12/23/24 1107)  Weight - Scale: 112 kg (246 lb 14.6 oz) (12/23/24 1107)  SpO2: 97 % (12/23/24 1331)  Physical Exam  Vitals and nursing note reviewed.   Constitutional:       General: She is not in acute distress.     Appearance: She is well-developed.   HENT:      " Head: Normocephalic and atraumatic.   Cardiovascular:      Rate and Rhythm: Normal rate and regular rhythm.      Heart sounds: No murmur heard.  Pulmonary:      Effort: Pulmonary effort is normal. No respiratory distress.      Breath sounds: Normal breath sounds.   Abdominal:      Palpations: Abdomen is soft.      Tenderness: There is no abdominal tenderness.   Musculoskeletal:         General: No swelling.      Cervical back: Neck supple.   Skin:     General: Skin is warm and dry.   Neurological:      Mental Status: She is alert.   Psychiatric:         Mood and Affect: Mood normal.          Discussion with Family: Patient declined call to .     Discharge instructions/Information to patient and family:   See after visit summary for information provided to patient and family.      Provisions for Follow-Up Care:  See after visit summary for information related to follow-up care and any pertinent home health orders.      Mobility at time of Discharge:   Basic Mobility Inpatient Raw Score: 24  JH-HLM Goal: 8: Walk 250 feet or more  JH-HLM Achieved: 8: Walk 250 feet ot more  HLM Goal achieved. Continue to encourage appropriate mobility.     Disposition:   Home    Planned Readmission: no    Discharge Medications:  See after visit summary for reconciled discharge medications provided to patient and/or family.      Administrative Statements   Discharge Statement:  I have spent a total time of 45 minutes in caring for this patient on the day of the visit/encounter. .    **Please Note: This note may have been constructed using a voice recognition system**